# Patient Record
Sex: FEMALE | Race: WHITE | NOT HISPANIC OR LATINO | Employment: OTHER | URBAN - METROPOLITAN AREA
[De-identification: names, ages, dates, MRNs, and addresses within clinical notes are randomized per-mention and may not be internally consistent; named-entity substitution may affect disease eponyms.]

---

## 2017-01-02 ENCOUNTER — OFFICE VISIT (OUTPATIENT)
Dept: URGENT CARE | Facility: CLINIC | Age: 62
End: 2017-01-02
Payer: COMMERCIAL

## 2017-01-02 VITALS
HEIGHT: 66 IN | SYSTOLIC BLOOD PRESSURE: 126 MMHG | RESPIRATION RATE: 18 BRPM | OXYGEN SATURATION: 99 % | TEMPERATURE: 98.4 F | DIASTOLIC BLOOD PRESSURE: 76 MMHG | HEART RATE: 84 BPM | BODY MASS INDEX: 29.41 KG/M2 | WEIGHT: 183 LBS

## 2017-01-02 DIAGNOSIS — S80.02XA CONTUSION OF LEFT KNEE, INITIAL ENCOUNTER: ICD-10-CM

## 2017-01-02 PROCEDURE — 99214 OFFICE O/P EST MOD 30 MIN: CPT | Performed by: FAMILY MEDICINE

## 2017-01-02 RX ORDER — NAPROXEN 500 MG/1
500 TABLET ORAL 2 TIMES DAILY WITH MEALS
Qty: 40 TAB | Refills: 0 | Status: SHIPPED | OUTPATIENT
Start: 2017-01-02 | End: 2022-06-09

## 2017-01-02 ASSESSMENT — ENCOUNTER SYMPTOMS
VOMITING: 0
SHORTNESS OF BREATH: 0
DIZZINESS: 0
NAUSEA: 0
ABDOMINAL PAIN: 0
FEVER: 0

## 2017-01-02 NOTE — MR AVS SNAPSHOT
"        Kim Reyna   2017 6:00 PM   Office Visit   MRN: [id number]    Department:  [facility]   Dept Phone:  [phone]    Description:  Female : 1955   Provider:  Hiren Moreno M.D.           Reason for Visit     Knee Pain Left knee, hit against bed, swollen       Allergies as of 2017     Allergen Noted Reactions    Sulfa Drugs 2010   Anaphylaxis      You were diagnosed with     Contusion of left knee, initial encounter   [[id number]]         Vital Signs     Blood Pressure Pulse Temperature Respirations Height Weight    126/76 mmHg 84 36.9 °C (98.4 °F) 18 1.676 m (5' 5.98\") 83.008 kg (183 lb)    Body Mass Index Oxygen Saturation                29.55 kg/m2 99%          Basic Information     Date Of Birth Sex Race Ethnicity Preferred Language    1955 Female White Non- English      Health Maintenance        Date Due Completion Dates    IMM DTaP/Tdap/Td Vaccine (1 - Tdap) 1975 ---    PAP SMEAR 1977 ---    COLONOSCOPY 2006 ---    IMM ZOSTER VACCINE 2016 ---    IMM INFLUENZA (1) 2016 ---    MAMMOGRAM 2017, 2010, 2010, 2008, 2008, 2007, 2007, 11/3/2006            Current Immunizations     No immunizations on file.      Below and/or attached are the medications your provider expects you to take. Review all of your home medications and newly ordered medications with your provider and/or pharmacist. Follow medication instructions as directed by your provider and/or pharmacist. Please keep your medication list with you and share with your provider. Update the information when medications are discontinued, doses are changed, or new medications (including over-the-counter products) are added; and carry medication information at all times in the event of emergency situations     Allergies:  SULFA DRUGS - Anaphylaxis               Medications  Valid as of: 2017 -  7:09 PM    Generic Name Brand Name Tablet " Size Instructions for use    Diphenoxylate-Atropine (Tab) LOMOTIL 2.5-0.025 MG Take 2 Tabs by mouth 4 times a day as needed for Diarrhea.        Doxycycline Hyclate (Tab) VIBRAMYCIN 100 MG Take 1 Tab by mouth 2 times a day.        Estradiol   by Does not apply route every day.        Estradiol (Tab) ESTRACE 1 MG Take 1 mg by mouth every day.        Lisinopril (Tab) PRINIVIL 10 MG Take 10 mg by mouth every day.        Naproxen (Tab) NAPROSYN 500 MG Take 1 Tab by mouth 2 times a day, with meals. Daily for 5 days, then when necessary for pain        Ondansetron HCl (Tab) ZOFRAN 4 MG Take 1 Tab by mouth 4 times a day as needed for Nausea/Vomiting.        Oxycodone-Acetaminophen (Tab) PERCOCET 5-325 MG Take 1-2 Tabs by mouth every four hours as needed for Mild Pain. pain        .                 Medicines prescribed today were sent to:     [facility] [address] [place] [address] [id number] [facility] [id number]    Phone: [phone] Fax: [phone]    Open 24 Hours?: No      Medication refill instructions:       If your prescription bottle indicates you have medication refills left, it is not necessary to call your provider’s office. Please contact your pharmacy and they will refill your medication.    If your prescription bottle indicates you do not have any refills left, you may request refills at any time through one of the following ways: The online [employer] system (except Urgent Care), by calling your provider’s office, or by asking your pharmacy to contact your provider’s office with a refill request. Medication refills are processed only during regular business hours and may not be available until the next business day. Your provider may request additional information or to have a follow-up visit with you prior to refilling your medication.   *Please Note: Medication refills are assigned a new Rx number when refilled electronically. Your  pharmacy may indicate that no refills were authorized even though a new prescription for the same medication is available at the pharmacy. Please request the medicine by name with the pharmacy before contacting your provider for a refill.           [employer] Access Code: [email or web address]  Expires: 2/1/2017  7:09 PM    [employer]  A secure, online tool to manage your health information     [employer]® is a secure, online tool that connects you to your personalized health information from the privacy of your home -- day or night - making it very easy for you to manage your healthcare. Once the activation process is completed, you can even access your medical information using the [employer] cash, which is available for free in the [employer] Cash store or Google Play store.     [employer] provides the following levels of access (as shown below):   My Chart Features   [facility] Doctor [facility]  Urgent  Care [facility]  Primary Care  Doctor   Email your healthcare team securely and privately 24/7 X X X    Manage appointments: schedule your next appointment; view details of past/upcoming appointments X      Request prescription refills. X      View recent personal medical records, including lab and immunizations X X X X   View health record, including health history, allergies, medications X X X X   Read reports about your outpatient visits, procedures, consult and ER notes X X X X   See your discharge summary, which is a recap of your hospital and/or ER visit that includes your diagnosis, lab results, and care plan. X X       How to register for [employer]:  1. Go to  [email or web address].  2. Click on the Sign Up Now box, which takes you to the New Member Sign Up page. You will need to provide the following information:  a. Enter your [employer] Access Code exactly as it appears at the top of this page. (You will not need to use this code after you’ve completed the sign-up process. If you do not  sign up before the expiration date, you must request a new code.)   b. Enter your date of birth.   c. Enter your home email address.   d. Click Submit, and follow the next screen’s instructions.  3. Create a [employer] ID. This will be your [employer] login ID and cannot be changed, so think of one that is secure and easy to remember.  4. Create a [employer] password. You can change your password at any time.  5. Enter your Password Reset Question and Answer. This can be used at a later time if you forget your password.   6. Enter your e-mail address. This allows you to receive e-mail notifications when new information is available in [employer].  7. Click Sign Up. You can now view your health information.    For assistance activating your [employer] account, call [phone]

## 2017-01-03 NOTE — PROGRESS NOTES
Subjective:      Kim Reyna is a 61 y.o. female who presents with Knee Pain          Knee Pain  This is a new problem. The current episode started yesterday. The problem occurs constantly. The problem has been gradually worsening. Pertinent negatives include no abdominal pain, chest pain, fever, nausea or vomiting. The symptoms are aggravated by walking. She has tried acetaminophen for the symptoms. The treatment provided no relief.   LEFT KNEE pain  Bumped into mother's bed when moving something  No pop  Prior arthroscopies x 4 (prior trauma)  Dull, tight, worse with bending  Peripatellar  No radiation  Improved with rest  Worse with activity    Review of Systems   Constitutional: Negative for fever.   Respiratory: Negative for shortness of breath.    Cardiovascular: Negative for chest pain.   Gastrointestinal: Negative for nausea, vomiting and abdominal pain.   Neurological: Negative for dizziness.        PMH:  has a past medical history of Cancer (HCC) ('93).  MEDS:   Current outpatient prescriptions:   •  lisinopril (PRINIVIL) 10 MG Tab, Take 10 mg by mouth every day., Disp: , Rfl:   •  estradiol (ESTRACE) 1 MG Tab, Take 1 mg by mouth every day., Disp: , Rfl:   •  ESTRADIOL, by Does not apply route every day., Disp: , Rfl:   •  doxycycline (VIBRAMYCIN) 100 MG Tab, Take 1 Tab by mouth 2 times a day., Disp: 20 Tab, Rfl: 0  •  oxycodone-acetaminophen (PERCOCET) 5-325 MG TABS, Take 1-2 Tabs by mouth every four hours as needed for Mild Pain. pain, Disp: 20 Each, Rfl: 0  •  ondansetron (ZOFRAN) 4 MG TABS, Take 1 Tab by mouth 4 times a day as needed for Nausea/Vomiting., Disp: 24 Each, Rfl: 0  •  diphenoxylate-atropine (LOMOTIL) 2.5-0.025 MG TABS, Take 2 Tabs by mouth 4 times a day as needed for Diarrhea., Disp: 16 Each, Rfl: 0  ALLERGIES:   Allergies   Allergen Reactions   • Sulfa Drugs Anaphylaxis     SURGHX:   Past Surgical History   Procedure Laterality Date   • Hysterectomy radical     • Other orthopedic  "surgery       left knee x4     SOCHX:    FH: Family history was reviewed, no pertinent findings to report       Objective:     /76 mmHg  Pulse 84  Temp(Src) 36.9 °C (98.4 °F)  Resp 18  Ht 1.676 m (5' 5.98\")  Wt 83.008 kg (183 lb)  BMI 29.55 kg/m2  SpO2 99%     Physical Exam      No acute distress  Normal respiratory effort  Mildly antalgic gait    Knee exam:  RIGHT KNEE:  Normal alignment  No visual swelling or deformity  Anterior knee nontender  Negative apprehension  POSITIVE medial joint line tenderness without any lateral joint line tenderness  Tamara's testing is negative medially and laterally  Lachman's testing is trace with good endpoint  No laxity to varus and valgus stress  The legs otherwise neurovascularly intact    LEFT KNEE:  Normal alignment  POSITIVE peripatellar swelling without palpable joint effusion  POSITIVE anterior tenderness/medial and lateral facet as well as anterior patellar tenderness   minimal apprehension  No  joint line tenderness medially or laterally  Tamara's testing is negative medially and laterally  Lachman's testing is trace with good endpoint  No laxity to varus and valgus stress  The legs otherwise neurovascularly intact         Assessment/Plan:     1. Contusion of left knee, initial encounter  naproxen (NAPROSYN) 500 MG Tab     Knee contusion with direct trauma while walking  Multiple surgeries of the left knee  Known history of osteoarthritis in the left knee  Majority of symptoms seem to be soft tissue/prepatellar without any intra-articular effusion  Naproxen  Topical anti-inflammatory  Follow-up if symptoms worsen or fail to improve  Follow-up with PCP  "

## 2019-06-03 ENCOUNTER — HOSPITAL ENCOUNTER (OUTPATIENT)
Dept: RADIOLOGY | Facility: MEDICAL CENTER | Age: 64
End: 2019-06-03
Attending: GENERAL PRACTICE
Payer: COMMERCIAL

## 2019-06-03 DIAGNOSIS — Z12.39 SCREENING BREAST EXAMINATION: ICD-10-CM

## 2019-06-03 PROCEDURE — 77067 SCR MAMMO BI INCL CAD: CPT

## 2020-11-27 ENCOUNTER — OFFICE VISIT (OUTPATIENT)
Dept: URGENT CARE | Facility: PHYSICIAN GROUP | Age: 65
End: 2020-11-27
Payer: COMMERCIAL

## 2020-11-27 ENCOUNTER — HOSPITAL ENCOUNTER (OUTPATIENT)
Facility: MEDICAL CENTER | Age: 65
End: 2020-11-27
Attending: NURSE PRACTITIONER
Payer: COMMERCIAL

## 2020-11-27 VITALS
TEMPERATURE: 98.6 F | HEART RATE: 116 BPM | DIASTOLIC BLOOD PRESSURE: 86 MMHG | OXYGEN SATURATION: 96 % | HEIGHT: 66 IN | SYSTOLIC BLOOD PRESSURE: 140 MMHG | RESPIRATION RATE: 20 BRPM | BODY MASS INDEX: 28.93 KG/M2 | WEIGHT: 180 LBS

## 2020-11-27 DIAGNOSIS — R52 GENERALIZED BODY ACHES: ICD-10-CM

## 2020-11-27 DIAGNOSIS — Z20.822 SUSPECTED COVID-19 VIRUS INFECTION: ICD-10-CM

## 2020-11-27 DIAGNOSIS — J02.9 PHARYNGITIS, UNSPECIFIED ETIOLOGY: ICD-10-CM

## 2020-11-27 DIAGNOSIS — R05.9 COUGH: ICD-10-CM

## 2020-11-27 DIAGNOSIS — J06.9 VIRAL URI WITH COUGH: ICD-10-CM

## 2020-11-27 DIAGNOSIS — J45.21 MILD INTERMITTENT ASTHMA WITH EXACERBATION: ICD-10-CM

## 2020-11-27 DIAGNOSIS — Z20.822 SUSPECTED COVID-19 VIRUS INFECTION: Primary | ICD-10-CM

## 2020-11-27 PROCEDURE — U0003 INFECTIOUS AGENT DETECTION BY NUCLEIC ACID (DNA OR RNA); SEVERE ACUTE RESPIRATORY SYNDROME CORONAVIRUS 2 (SARS-COV-2) (CORONAVIRUS DISEASE [COVID-19]), AMPLIFIED PROBE TECHNIQUE, MAKING USE OF HIGH THROUGHPUT TECHNOLOGIES AS DESCRIBED BY CMS-2020-01-R: HCPCS

## 2020-11-27 PROCEDURE — 99204 OFFICE O/P NEW MOD 45 MIN: CPT | Mod: CS | Performed by: NURSE PRACTITIONER

## 2020-11-27 RX ORDER — METHYLPREDNISOLONE 4 MG/1
TABLET ORAL
Qty: 21 TAB | Refills: 0 | Status: SHIPPED | OUTPATIENT
Start: 2020-11-27 | End: 2022-06-09

## 2020-11-27 RX ORDER — DOXYCYCLINE HYCLATE 100 MG
100 TABLET ORAL 2 TIMES DAILY
Qty: 14 TAB | Refills: 0 | Status: SHIPPED | OUTPATIENT
Start: 2020-11-27 | End: 2020-12-04

## 2020-11-27 ASSESSMENT — COPD QUESTIONNAIRES: COPD: 0

## 2020-11-27 ASSESSMENT — ENCOUNTER SYMPTOMS
COUGH: 1
DIZZINESS: 0
SORE THROAT: 1
FEVER: 1
TINGLING: 0
WHEEZING: 0
HEADACHES: 0
VOMITING: 0
MEMORY LOSS: 0
HEARTBURN: 0
DIARRHEA: 0
BACK PAIN: 0
PALPITATIONS: 0
SHORTNESS OF BREATH: 0
ORTHOPNEA: 0
CONSTIPATION: 0
CHILLS: 0
NAUSEA: 0
MYALGIAS: 1

## 2020-11-28 LAB
COVID ORDER STATUS COVID19: NORMAL
SARS-COV-2 RNA RESP QL NAA+PROBE: DETECTED
SPECIMEN SOURCE: ABNORMAL

## 2020-11-28 NOTE — PROGRESS NOTES
Subjective:      Kim Reyna is a 65 y.o. female who presents with Cough (bronchitis, body aches, sleeping more, x6 days )        Patient denies known Covid 19 exposure  Denies changes in sense of taste or smell.  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  She does have asthma which is well controlled and not requiring inhaler more frequently.     Cough  This is a new problem. The current episode started in the past 7 days. The problem has been gradually worsening. The problem occurs every few minutes. The cough is non-productive. Associated symptoms include a fever, myalgias and a sore throat. Pertinent negatives include no chest pain, chills, ear pain, headaches, heartburn, rash, shortness of breath or wheezing. The symptoms are aggravated by exercise. She has tried prescription cough suppressant for the symptoms. The treatment provided mild relief. Her past medical history is significant for asthma and bronchitis. There is no history of COPD, emphysema or pneumonia.       Review of Systems   Constitutional: Positive for fever and malaise/fatigue. Negative for chills.   HENT: Positive for congestion and sore throat. Negative for ear pain.    Respiratory: Positive for cough. Negative for shortness of breath and wheezing.    Cardiovascular: Negative for chest pain, palpitations, orthopnea and leg swelling.   Gastrointestinal: Negative for constipation, diarrhea, heartburn, nausea and vomiting.   Musculoskeletal: Positive for myalgias. Negative for back pain and joint pain.   Skin: Negative for rash.   Neurological: Negative for dizziness, tingling and headaches.   Psychiatric/Behavioral: Negative for memory loss and suicidal ideas.   All other systems reviewed and are negative.    PMH:  has a past medical history of Allergy, Asthma, Cancer ([facility]) ('93), Hypertension, Migraine, and Urinary tract infection, site not specified.  MEDS:   Current Outpatient  Medications:   •  doxycycline (VIBRAMYCIN) 100 MG Tab, Take 1 Tab by mouth 2 times a day for 7 days., Disp: 14 Tab, Rfl: 0  •  methylPREDNISolone (MEDROL DOSEPAK) 4 MG Tablet Therapy Pack, Follow schedule on package instructions., Disp: 21 Tab, Rfl: 0  •  Omega-3 Fatty Acids (FISH OIL) 1000 MG Cap capsule, Take 1,000 mg by mouth 3 times a day, with meals., Disp: , Rfl:   •  Coenzyme Q10 (CO Q 10 PO), Take  by mouth., Disp: , Rfl:   •  lisinopril (PRINIVIL) 10 MG Tab, Take 10 mg by mouth every day., Disp: , Rfl:   •  estradiol (ESTRACE) 1 MG Tab, Take 1 mg by mouth every day., Disp: , Rfl:   •  ESTRADIOL, by Does not apply route every day., Disp: , Rfl:   •  LEVOFLOXACIN OP, by Ophthalmic route., Disp: , Rfl:   •  ibuprofen (MOTRIN) 800 MG Tab, Take 800 mg by mouth every 8 hours as needed., Disp: , Rfl:   •  naproxen (NAPROSYN) 500 MG Tab, Take 1 Tab by mouth 2 times a day, with meals. Daily for 5 days, then when necessary for pain (Patient not taking: Reported on 11/27/2020), Disp: 40 Tab, Rfl: 0  •  oxycodone-acetaminophen (PERCOCET) 5-325 MG TABS, Take 1-2 Tabs by mouth every four hours as needed for Mild Pain. pain (Patient not taking: Reported on 11/27/2020), Disp: 20 Each, Rfl: 0  •  ondansetron (ZOFRAN) 4 MG TABS, Take 1 Tab by mouth 4 times a day as needed for Nausea/Vomiting. (Patient not taking: Reported on 11/27/2020), Disp: 24 Each, Rfl: 0  •  diphenoxylate-atropine (LOMOTIL) 2.5-0.025 MG TABS, Take 2 Tabs by mouth 4 times a day as needed for Diarrhea. (Patient not taking: Reported on 11/27/2020), Disp: 16 Each, Rfl: 0  ALLERGIES:   Allergies   Allergen Reactions   • Sulfa Drugs Anaphylaxis     SURGHX:   Past Surgical History:   Procedure Laterality Date   • CARPAL TUNNEL RELEASE Right    • HYSTERECTOMY RADICAL     • KNEE ARTHROSCOPY      4 surgerys   • OTHER ORTHOPEDIC SURGERY      left knee x4     SOCHX:  reports that she has never smoked. She has never used smokeless tobacco.  FH: Reviewed with  "patient, not pertinent to this visit.        Objective:     /86 (BP Location: Left arm, Patient Position: Sitting, BP Cuff Size: Adult)   Pulse (!) 116   Temp 37 °C (98.6 °F) (Temporal)   Resp 20   Ht 1.676 m (5' 6\")   Wt 81.6 kg (180 lb)   SpO2 96%   BMI 29.05 kg/m²      Physical Exam  Vitals signs reviewed.   Constitutional:       General: She is not in acute distress.     Appearance: Normal appearance. She is ill-appearing.   HENT:      Head: Normocephalic.      Right Ear: Tympanic membrane, ear canal and external ear normal.      Left Ear: Tympanic membrane, ear canal and external ear normal.      Nose: Congestion and rhinorrhea present.      Mouth/Throat:      Lips: Pink.      Mouth: Mucous membranes are moist.      Pharynx: Uvula midline. Posterior oropharyngeal erythema present. No pharyngeal swelling, oropharyngeal exudate or uvula swelling.   Eyes:      Extraocular Movements: Extraocular movements intact.      Conjunctiva/sclera: Conjunctivae normal.      Pupils: Pupils are equal, round, and reactive to light.   Neck:      Musculoskeletal: Normal range of motion and neck supple. No muscular tenderness.   Cardiovascular:      Rate and Rhythm: Regular rhythm. Tachycardia present.      Pulses: Normal pulses.      Heart sounds: Normal heart sounds. No murmur.   Pulmonary:      Effort: Pulmonary effort is normal. No respiratory distress.      Breath sounds: Wheezing present. No rhonchi or rales.   Abdominal:      Palpations: Abdomen is soft.   Musculoskeletal: Normal range of motion.      Right lower leg: No edema.      Left lower leg: No edema.   Lymphadenopathy:      Cervical: No cervical adenopathy.   Skin:     General: Skin is warm and dry.      Capillary Refill: Capillary refill takes less than 2 seconds.   Neurological:      Mental Status: She is alert and oriented to person, place, and time.   Psychiatric:         Mood and Affect: Mood normal.         Behavior: Behavior normal.               "   Assessment/Plan:        1. Suspected COVID-19 virus infection  COVID/SARS COV-2 PCR   2. Mild intermittent asthma with exacerbation  doxycycline (VIBRAMYCIN) 100 MG Tab    methylPREDNISolone (MEDROL DOSEPAK) 4 MG Tablet Therapy Pack   3. Viral URI with cough  doxycycline (VIBRAMYCIN) 100 MG Tab    methylPREDNISolone (MEDROL DOSEPAK) 4 MG Tablet Therapy Pack   4. Generalized body aches  COVID/SARS COV-2 PCR   5. Cough  COVID/SARS COV-2 PCR   6. Pharyngitis, unspecified etiology  COVID/SARS COV-2 PCR       May take over-the-counter Ibuprofen 400-600 mg every 8 hours as needed for pain    Await Covid results.    Discussed with patient that symptoms are suspicious for Covid-19 infection vs other viral illness/bronchitis.  Vitals are stable at this time.  Patient is advised to self isolate and provided with self isolation precautions AVS information.  Discussed when to return to urgent care or ER including for worsening shortness of breath.  Patient verbalized understanding and has no additional questions.    Plan of care, medications and treatments reviewed with patient and or guardian.  Patient and or guardian voices understanding and agrees with the instructions provided. After visit summary reviewed with patient. Patient and or guardian understand the parameters for reevaluation and ER precautions discussed.     Please note that this dictation was created using voice recognition software. I have made every reasonable attempt to correct obvious errors, but I expect that there are errors of grammar and possibly content that I did not discover before finalizing the note.

## 2021-03-01 ENCOUNTER — HOSPITAL ENCOUNTER (OUTPATIENT)
Dept: RADIOLOGY | Facility: MEDICAL CENTER | Age: 66
End: 2021-03-01
Attending: GENERAL PRACTICE
Payer: COMMERCIAL

## 2021-03-01 DIAGNOSIS — Z12.31 VISIT FOR SCREENING MAMMOGRAM: ICD-10-CM

## 2021-03-01 PROCEDURE — 77063 BREAST TOMOSYNTHESIS BI: CPT

## 2021-12-18 ENCOUNTER — OFFICE VISIT (OUTPATIENT)
Dept: URGENT CARE | Facility: PHYSICIAN GROUP | Age: 66
End: 2021-12-18
Payer: MEDICARE

## 2021-12-18 ENCOUNTER — APPOINTMENT (OUTPATIENT)
Dept: RADIOLOGY | Facility: IMAGING CENTER | Age: 66
End: 2021-12-18
Attending: PHYSICIAN ASSISTANT
Payer: MEDICARE

## 2021-12-18 VITALS
RESPIRATION RATE: 16 BRPM | BODY MASS INDEX: 28.93 KG/M2 | HEIGHT: 66 IN | OXYGEN SATURATION: 97 % | DIASTOLIC BLOOD PRESSURE: 66 MMHG | WEIGHT: 180 LBS | TEMPERATURE: 97.6 F | HEART RATE: 72 BPM | SYSTOLIC BLOOD PRESSURE: 128 MMHG

## 2021-12-18 DIAGNOSIS — M79.641 RIGHT HAND PAIN: ICD-10-CM

## 2021-12-18 DIAGNOSIS — S60.221A CONTUSION OF RIGHT HAND, INITIAL ENCOUNTER: ICD-10-CM

## 2021-12-18 PROCEDURE — 99214 OFFICE O/P EST MOD 30 MIN: CPT | Performed by: PHYSICIAN ASSISTANT

## 2021-12-18 PROCEDURE — 73130 X-RAY EXAM OF HAND: CPT | Mod: TC,RT | Performed by: PHYSICIAN ASSISTANT

## 2021-12-18 RX ORDER — DICLOFENAC SODIUM 30 MG/G
1-2 GEL TOPICAL 3 TIMES DAILY
Qty: 100 G | Refills: 0 | Status: SHIPPED | OUTPATIENT
Start: 2021-12-18 | End: 2022-06-09

## 2021-12-18 ASSESSMENT — ENCOUNTER SYMPTOMS
WEAKNESS: 1
MYALGIAS: 1

## 2021-12-18 NOTE — PROGRESS NOTES
Subjective:   Kim Reyna is a 66 y.o. female who presents for Hand Pain (Left hand/wrist and arm pain x 5 days.  Car door was closed on her hand on Monday)        Hand Injury  This is a new problem. The current episode started in the past 7 days (5 days-patient should hand in car door.  She did not injure the forearm or elbow.). The problem occurs constantly. The problem has been unchanged. Associated symptoms include myalgias and weakness. Associated symptoms comments: Bruising and swelling of the dorsal right hand.  Slightly restricted range of motion at first MCP and pain with movement.  Occasionally zapping pain up the forearm to the elbow.. She has tried NSAIDs and rest for the symptoms. The treatment provided mild relief.     Review of Systems   Musculoskeletal: Positive for myalgias.   Neurological: Positive for weakness.       PMH:  has a past medical history of Allergy, Asthma, Cancer ([facility]) ('93), Hypertension, Migraine, and Urinary tract infection, site not specified.  MEDS:   Current Outpatient Medications:   •  diclofenac sodium 3 % Gel, Apply 1-2 g topically 3 times a day., Disp: 100 g, Rfl: 0  •  lisinopril (PRINIVIL) 10 MG Tab, Take 10 mg by mouth every day., Disp: , Rfl:   •  methylPREDNISolone (MEDROL DOSEPAK) 4 MG Tablet Therapy Pack, Follow schedule on package instructions. (Patient not taking: Reported on 12/18/2021), Disp: 21 Tab, Rfl: 0  •  Omega-3 Fatty Acids (FISH OIL) 1000 MG Cap capsule, Take 1,000 mg by mouth 3 times a day, with meals. (Patient not taking: Reported on 12/18/2021), Disp: , Rfl:   •  Coenzyme Q10 (CO Q 10 PO), Take  by mouth. (Patient not taking: Reported on 12/18/2021), Disp: , Rfl:   •  LEVOFLOXACIN OP, by Ophthalmic route. (Patient not taking: Reported on 12/18/2021), Disp: , Rfl:   •  ibuprofen (MOTRIN) 800 MG Tab, Take 800 mg by mouth every 8 hours as needed. (Patient not taking: Reported on 12/18/2021), Disp: , Rfl:   •  naproxen (NAPROSYN) 500 MG Tab, Take 1 Tab  "by mouth 2 times a day, with meals. Daily for 5 days, then when necessary for pain (Patient not taking: Reported on 11/27/2020), Disp: 40 Tab, Rfl: 0  •  estradiol (ESTRACE) 1 MG Tab, Take 1 mg by mouth every day. (Patient not taking: Reported on 12/18/2021), Disp: , Rfl:   •  ESTRADIOL, by Does not apply route every day. (Patient not taking: Reported on 12/18/2021), Disp: , Rfl:   •  oxycodone-acetaminophen (PERCOCET) 5-325 MG TABS, Take 1-2 Tabs by mouth every four hours as needed for Mild Pain. pain (Patient not taking: Reported on 11/27/2020), Disp: 20 Each, Rfl: 0  •  ondansetron (ZOFRAN) 4 MG TABS, Take 1 Tab by mouth 4 times a day as needed for Nausea/Vomiting. (Patient not taking: Reported on 11/27/2020), Disp: 24 Each, Rfl: 0  •  diphenoxylate-atropine (LOMOTIL) 2.5-0.025 MG TABS, Take 2 Tabs by mouth 4 times a day as needed for Diarrhea. (Patient not taking: Reported on 11/27/2020), Disp: 16 Each, Rfl: 0  ALLERGIES:   Allergies   Allergen Reactions   • Sulfa Drugs Anaphylaxis     SURGHX:   Past Surgical History:   Procedure Laterality Date   • CARPAL TUNNEL RELEASE Right    • HYSTERECTOMY RADICAL     • KNEE ARTHROSCOPY      4 surgerys   • OTHER ORTHOPEDIC SURGERY      left knee x4     SOCHX:  reports that she has never smoked. She has never used smokeless tobacco.  FH: Family history was reviewed, no pertinent findings to report   Objective:   /66   Pulse 72   Temp 36.4 °C (97.6 °F) (Temporal)   Resp 16   Ht 1.664 m (5' 5.5\")   Wt 81.6 kg (180 lb)   SpO2 97%   BMI 29.50 kg/m²   Physical Exam  Vitals reviewed.   Constitutional:       General: She is not in acute distress.     Appearance: Normal appearance. She is well-developed. She is not toxic-appearing.   HENT:      Head: Normocephalic and atraumatic.      Right Ear: External ear normal.      Left Ear: External ear normal.      Nose: Nose normal.   Cardiovascular:      Rate and Rhythm: Normal rate and regular rhythm.   Pulmonary:      " Effort: Pulmonary effort is normal. No respiratory distress.      Breath sounds: No stridor.   Musculoskeletal:      Comments: Right hand:  Appearance: Mild ecchymosis and edema over dorsal aspect of hand in vicinity of second and third MCPs.  No gross deformity.  Palpation: Tender to palpation over the second MCP and generalized tenderness of the soft tissues in this area.  Otherwise no bony tenderness with palpation.  No palpable step-offs or deformities.  Wrist and forearm nontender to palpation.  Range of motion: Mildly restricted range of motion at second MTP.  Other digits ranged within normal limits.  Wrist range of motion within normal limits.  Neurovascular: Radial, median, ulnar nerves intact.  Sensation intact.  Radial pulse +2 and cap refill is brisk.     Skin:     General: Skin is dry.   Neurological:      Comments: Alert and oriented. CN2-12 grossly intact   Psychiatric:         Speech: Speech normal.         Behavior: Behavior normal.         Imaging:    COMPARISON:  None     FINDINGS:  No acute fracture or malalignment. There are small osteophytes in the interphalangeal joints.     IMPRESSION:     1.  No acute or subacute fracture.     2.  Mild osteoarthritis of the interphalangeal joints  Assessment/Plan:   1. Contusion of right hand, initial encounter  - diclofenac sodium 3 % Gel; Apply 1-2 g topically 3 times a day.  Dispense: 100 g; Refill: 0    2. Right hand pain  - DX-HAND 3+ RIGHT; Future  - diclofenac sodium 3 % Gel; Apply 1-2 g topically 3 times a day.  Dispense: 100 g; Refill: 0    Imaging reviewed with patient.  No evidence of acute bony injury.  Patient placed in splint for comfort.  I would like her to rest, ice, elevate.  Apply topical diclofenac as needed to mitigate pain and swelling.  If symptoms fail to significantly improve in the next 5 to 7 days I would like her to follow-up with her PCP for reevaluation and further management.    Differential diagnosis, natural history,  supportive care, and indications for immediate follow-up discussed.

## 2022-05-13 ENCOUNTER — TELEPHONE (OUTPATIENT)
Dept: SCHEDULING | Facility: IMAGING CENTER | Age: 67
End: 2022-05-13

## 2022-06-09 ENCOUNTER — OFFICE VISIT (OUTPATIENT)
Dept: MEDICAL GROUP | Facility: PHYSICIAN GROUP | Age: 67
End: 2022-06-09
Payer: MEDICARE

## 2022-06-09 VITALS
DIASTOLIC BLOOD PRESSURE: 64 MMHG | HEART RATE: 73 BPM | BODY MASS INDEX: 29.12 KG/M2 | TEMPERATURE: 98.1 F | SYSTOLIC BLOOD PRESSURE: 136 MMHG | OXYGEN SATURATION: 96 % | WEIGHT: 174.8 LBS | HEIGHT: 65 IN

## 2022-06-09 DIAGNOSIS — I10 PRIMARY HYPERTENSION: ICD-10-CM

## 2022-06-09 DIAGNOSIS — Z12.11 ENCOUNTER FOR SCREENING FOR MALIGNANT NEOPLASM OF COLON: ICD-10-CM

## 2022-06-09 DIAGNOSIS — D22.9 MULTIPLE NEVI: ICD-10-CM

## 2022-06-09 DIAGNOSIS — Z12.31 ENCOUNTER FOR SCREENING MAMMOGRAM FOR BREAST CANCER: ICD-10-CM

## 2022-06-09 DIAGNOSIS — J45.21 MILD INTERMITTENT ASTHMA WITH ACUTE EXACERBATION: ICD-10-CM

## 2022-06-09 DIAGNOSIS — Z23 NEED FOR VACCINATION: ICD-10-CM

## 2022-06-09 DIAGNOSIS — Z11.3 ENCOUNTER FOR SPECIAL SCREENING EXAMINATION FOR INFECTION WITH PREDOMINANTLY SEXUAL MODE OF TRANSMISSION: ICD-10-CM

## 2022-06-09 DIAGNOSIS — Z00.00 ENCOUNTER FOR MEDICAL EXAMINATION TO ESTABLISH CARE: ICD-10-CM

## 2022-06-09 DIAGNOSIS — Z78.0 ENCOUNTER FOR OSTEOPOROSIS SCREENING IN ASYMPTOMATIC POSTMENOPAUSAL PATIENT: ICD-10-CM

## 2022-06-09 DIAGNOSIS — Z13.820 ENCOUNTER FOR OSTEOPOROSIS SCREENING IN ASYMPTOMATIC POSTMENOPAUSAL PATIENT: ICD-10-CM

## 2022-06-09 DIAGNOSIS — G43.009 MIGRAINE WITHOUT AURA AND WITHOUT STATUS MIGRAINOSUS, NOT INTRACTABLE: ICD-10-CM

## 2022-06-09 PROBLEM — J45.909 ASTHMA: Status: ACTIVE | Noted: 2022-06-09

## 2022-06-09 PROBLEM — G43.909 MIGRAINES: Status: ACTIVE | Noted: 2022-06-09

## 2022-06-09 PROCEDURE — 90471 IMMUNIZATION ADMIN: CPT

## 2022-06-09 PROCEDURE — 90677 PCV20 VACCINE IM: CPT

## 2022-06-09 PROCEDURE — 90715 TDAP VACCINE 7 YRS/> IM: CPT

## 2022-06-09 PROCEDURE — 99214 OFFICE O/P EST MOD 30 MIN: CPT

## 2022-06-09 PROCEDURE — 90472 IMMUNIZATION ADMIN EACH ADD: CPT

## 2022-06-09 RX ORDER — ALBUTEROL SULFATE 90 UG/1
2 AEROSOL, METERED RESPIRATORY (INHALATION) 2 TIMES DAILY
COMMUNITY
Start: 2022-03-25

## 2022-06-09 RX ORDER — LISINOPRIL 10 MG/1
20 TABLET ORAL DAILY
Qty: 90 TABLET | Refills: 3 | Status: CANCELLED | OUTPATIENT
Start: 2022-06-09

## 2022-06-09 ASSESSMENT — PATIENT HEALTH QUESTIONNAIRE - PHQ9: CLINICAL INTERPRETATION OF PHQ2 SCORE: 0

## 2022-06-09 NOTE — LETTER
[facility]  Kaleb Dunlap M.D.  [address] [facility] [address]  Fax: [phone]   Authorization for Release/Disclosure of   Protected Health Information   Name: TALI CARRERO : 1955 SSN: [id number]   Address: [address] [address] Phone:    [phone] (home) [phone] (work)   I authorize the entity listed below to release/disclose the PHI below to:   [facility]/Kaleb Dunlap M.D. and DELL Mcdonald   Provider or Entity Name:  Dr Kaleb Dunlap    Address   [facility], [facility]  [address] [place] [address] Phone:  [phone]    Fax:  [phone]   Reason for request: continuity of care   Information to be released:    [  ] LAST COLONOSCOPY,  including any PATH REPORT and follow-up  [  ] LAST FIT/COLOGUARD RESULT [  ] LAST DEXA  [  ] LAST MAMMOGRAM  [  ] LAST PAP  [  ] LAST LABS [  ] RETINA EXAM REPORT  [  ] IMMUNIZATION RECORDS  [X] Release all info      [  ] Check here and initial the line next to each item to release ALL health information INCLUDING  _____ Care and treatment for drug and / or alcohol abuse  _____ HIV testing, infection status, or AIDS  _____ Genetic Testing    DATES OF SERVICE OR TIME PERIOD TO BE DISCLOSED: _____________  I understand and acknowledge that:  * This Authorization may be revoked at any time by you in writing, except if your health information has already been used or disclosed.  * Your health information that will be used or disclosed as a result of you signing this authorization could be re-disclosed by the recipient. If this occurs, your re-disclosed health information may no longer be protected by State or Federal laws.  * You may refuse to sign this Authorization. Your refusal will not affect your ability to obtain treatment.  * This Authorization becomes effective upon signing and will  on (date) __________.      If no date is indicated, this Authorization will  one (1) year from the signature date.    Name:  Kim Reyna    Signature:  continuity of care   Date:     6/9/2022       PLEASE FAX REQUESTED RECORDS BACK TO: [phone]
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
## 2022-06-09 NOTE — ASSESSMENT & PLAN NOTE
This is a chronic condition, stable.  Patient states she gets a migraine approximately once a month.  She denies aura.  She takes over-the-counter medication with good results.  She states does not debilitating and it does not stop her from doing the things she needs to do.  Continue current management with over-the-counter symptom control.

## 2022-06-09 NOTE — ASSESSMENT & PLAN NOTE
Is a new condition.  Patient states she started developing bumps on her face.  There is one on her right eyelid and 2 on her chin.  Upon assessment, consistent with multiple Nevi.  States she is interested in getting them removed.  Referral placed to dermatology.

## 2022-06-09 NOTE — ASSESSMENT & PLAN NOTE
Chronic, not controlled.  Lisinopril 10 mg.  136/64 in clinic today  Doesn't monitor BP at home.  Will come back in 2 weeks for MA visit for BP check.  Will consider increasing Lisinopril at that time.

## 2022-06-17 ENCOUNTER — HOSPITAL ENCOUNTER (OUTPATIENT)
Dept: LAB | Facility: MEDICAL CENTER | Age: 67
End: 2022-06-17
Payer: MEDICARE

## 2022-06-17 DIAGNOSIS — Z00.00 ENCOUNTER FOR MEDICAL EXAMINATION TO ESTABLISH CARE: ICD-10-CM

## 2022-06-17 DIAGNOSIS — Z11.3 ENCOUNTER FOR SPECIAL SCREENING EXAMINATION FOR INFECTION WITH PREDOMINANTLY SEXUAL MODE OF TRANSMISSION: ICD-10-CM

## 2022-06-17 LAB
25(OH)D3 SERPL-MCNC: 30 NG/ML (ref 30–100)
ALBUMIN SERPL BCP-MCNC: 4.6 G/DL (ref 3.2–4.9)
ALBUMIN/GLOB SERPL: 1.4 G/DL
ALP SERPL-CCNC: 87 U/L (ref 30–99)
ALT SERPL-CCNC: 17 U/L (ref 2–50)
ANION GAP SERPL CALC-SCNC: 13 MMOL/L (ref 7–16)
AST SERPL-CCNC: 19 U/L (ref 12–45)
BASOPHILS # BLD AUTO: 3.5 % (ref 0–1.8)
BASOPHILS # BLD: 0.33 K/UL (ref 0–0.12)
BILIRUB SERPL-MCNC: 0.3 MG/DL (ref 0.1–1.5)
BUN SERPL-MCNC: 17 MG/DL (ref 8–22)
CALCIUM SERPL-MCNC: 9.5 MG/DL (ref 8.5–10.5)
CHLORIDE SERPL-SCNC: 105 MMOL/L (ref 96–112)
CHOLEST SERPL-MCNC: 237 MG/DL (ref 100–199)
CO2 SERPL-SCNC: 24 MMOL/L (ref 20–33)
CREAT SERPL-MCNC: 1.11 MG/DL (ref 0.5–1.4)
EOSINOPHIL # BLD AUTO: 0.25 K/UL (ref 0–0.51)
EOSINOPHIL NFR BLD: 2.6 % (ref 0–6.9)
ERYTHROCYTE [DISTWIDTH] IN BLOOD BY AUTOMATED COUNT: 44.3 FL (ref 35.9–50)
EST. AVERAGE GLUCOSE BLD GHB EST-MCNC: 134 MG/DL
FASTING STATUS PATIENT QL REPORTED: NORMAL
GFR SERPLBLD CREATININE-BSD FMLA CKD-EPI: 55 ML/MIN/1.73 M 2
GLOBULIN SER CALC-MCNC: 3.4 G/DL (ref 1.9–3.5)
GLUCOSE SERPL-MCNC: 113 MG/DL (ref 65–99)
HBA1C MFR BLD: 6.3 % (ref 4–5.6)
HCT VFR BLD AUTO: 41.6 % (ref 37–47)
HCV AB SER QL: NORMAL
HDLC SERPL-MCNC: 47 MG/DL
HGB BLD-MCNC: 13.5 G/DL (ref 12–16)
LDLC SERPL CALC-MCNC: 162 MG/DL
LYMPHOCYTES # BLD AUTO: 3.42 K/UL (ref 1–4.8)
LYMPHOCYTES NFR BLD: 36 % (ref 22–41)
MANUAL DIFF BLD: NORMAL
MCH RBC QN AUTO: 29.5 PG (ref 27–33)
MCHC RBC AUTO-ENTMCNC: 32.5 G/DL (ref 33.6–35)
MCV RBC AUTO: 90.8 FL (ref 81.4–97.8)
MONOCYTES # BLD AUTO: 0.58 K/UL (ref 0–0.85)
MONOCYTES NFR BLD AUTO: 6.1 % (ref 0–13.4)
MORPHOLOGY BLD-IMP: NORMAL
NEUTROPHILS # BLD AUTO: 4.92 K/UL (ref 2–7.15)
NEUTROPHILS NFR BLD: 51.8 % (ref 44–72)
NRBC # BLD AUTO: 0 K/UL
NRBC BLD-RTO: 0 /100 WBC
PLATELET # BLD AUTO: 290 K/UL (ref 164–446)
PLATELET BLD QL SMEAR: NORMAL
PMV BLD AUTO: 10.7 FL (ref 9–12.9)
POTASSIUM SERPL-SCNC: 4.5 MMOL/L (ref 3.6–5.5)
PROT SERPL-MCNC: 8 G/DL (ref 6–8.2)
RBC # BLD AUTO: 4.58 M/UL (ref 4.2–5.4)
RBC BLD AUTO: NORMAL
SODIUM SERPL-SCNC: 142 MMOL/L (ref 135–145)
TRIGL SERPL-MCNC: 140 MG/DL (ref 0–149)
TSH SERPL DL<=0.005 MIU/L-ACNC: 1.4 UIU/ML (ref 0.38–5.33)
WBC # BLD AUTO: 9.5 K/UL (ref 4.8–10.8)

## 2022-06-17 PROCEDURE — 80061 LIPID PANEL: CPT

## 2022-06-17 PROCEDURE — 85025 COMPLETE CBC W/AUTO DIFF WBC: CPT

## 2022-06-17 PROCEDURE — 84443 ASSAY THYROID STIM HORMONE: CPT

## 2022-06-17 PROCEDURE — 36415 COLL VENOUS BLD VENIPUNCTURE: CPT

## 2022-06-17 PROCEDURE — 86803 HEPATITIS C AB TEST: CPT

## 2022-06-17 PROCEDURE — 80053 COMPREHEN METABOLIC PANEL: CPT

## 2022-06-17 PROCEDURE — 85007 BL SMEAR W/DIFF WBC COUNT: CPT

## 2022-06-17 PROCEDURE — 83036 HEMOGLOBIN GLYCOSYLATED A1C: CPT

## 2022-06-17 PROCEDURE — 82306 VITAMIN D 25 HYDROXY: CPT

## 2022-06-21 ENCOUNTER — HOSPITAL ENCOUNTER (OUTPATIENT)
Dept: RADIOLOGY | Facility: MEDICAL CENTER | Age: 67
End: 2022-06-21
Payer: MEDICARE

## 2022-06-21 DIAGNOSIS — Z12.31 ENCOUNTER FOR SCREENING MAMMOGRAM FOR BREAST CANCER: ICD-10-CM

## 2022-06-21 PROCEDURE — 77063 BREAST TOMOSYNTHESIS BI: CPT

## 2022-06-23 ENCOUNTER — NON-PROVIDER VISIT (OUTPATIENT)
Dept: MEDICAL GROUP | Facility: PHYSICIAN GROUP | Age: 67
End: 2022-06-23
Payer: MEDICARE

## 2022-06-23 VITALS — SYSTOLIC BLOOD PRESSURE: 116 MMHG | DIASTOLIC BLOOD PRESSURE: 64 MMHG

## 2022-06-23 NOTE — PROGRESS NOTES
Kim Reyna is a 67 y.o. female here for a non-provider visit for BP check      Encounter Vitals  Blood Pressure : 116/64    If abnormal, was the Registered Nurse (office provider if RN is unavailable) notified today? Not Indicated    Routed to PCP/Requested Provider? Yes

## 2022-06-24 PROBLEM — E78.41 ELEVATED LIPOPROTEIN(A): Status: ACTIVE | Noted: 2022-06-24

## 2022-07-01 ENCOUNTER — HOSPITAL ENCOUNTER (OUTPATIENT)
Dept: RADIOLOGY | Facility: MEDICAL CENTER | Age: 67
End: 2022-07-01
Payer: MEDICARE

## 2022-07-01 DIAGNOSIS — Z78.0 ENCOUNTER FOR OSTEOPOROSIS SCREENING IN ASYMPTOMATIC POSTMENOPAUSAL PATIENT: ICD-10-CM

## 2022-07-01 DIAGNOSIS — Z13.820 ENCOUNTER FOR OSTEOPOROSIS SCREENING IN ASYMPTOMATIC POSTMENOPAUSAL PATIENT: ICD-10-CM

## 2022-07-01 PROCEDURE — 77080 DXA BONE DENSITY AXIAL: CPT

## 2022-08-02 ENCOUNTER — OFFICE VISIT (OUTPATIENT)
Dept: MEDICAL GROUP | Facility: PHYSICIAN GROUP | Age: 67
End: 2022-08-02
Payer: MEDICARE

## 2022-08-02 VITALS
HEIGHT: 65 IN | TEMPERATURE: 98.9 F | WEIGHT: 172.6 LBS | BODY MASS INDEX: 28.76 KG/M2 | OXYGEN SATURATION: 97 % | DIASTOLIC BLOOD PRESSURE: 62 MMHG | HEART RATE: 78 BPM | SYSTOLIC BLOOD PRESSURE: 100 MMHG

## 2022-08-02 DIAGNOSIS — E78.00 PURE HYPERCHOLESTEROLEMIA: ICD-10-CM

## 2022-08-02 DIAGNOSIS — R73.03 PREDIABETES: ICD-10-CM

## 2022-08-02 DIAGNOSIS — I10 PRIMARY HYPERTENSION: ICD-10-CM

## 2022-08-02 DIAGNOSIS — N18.31 STAGE 3A CHRONIC KIDNEY DISEASE: ICD-10-CM

## 2022-08-02 PROCEDURE — 99212 OFFICE O/P EST SF 10 MIN: CPT

## 2022-08-02 ASSESSMENT — FIBROSIS 4 INDEX: FIB4 SCORE: 1.05

## 2022-08-02 NOTE — ASSESSMENT & PLAN NOTE
Chronic, ongoing.  LDL - 162.   The 10-year ASCVD risk score (Blairelizabeth GREWAL Jr., et al., 2013) is: 5.9%   Medication not indicated at this time. Counseled on diet and exercise.

## 2022-08-02 NOTE — ASSESSMENT & PLAN NOTE
Chronic, controlled.  Patient takes lisinopril 10 mg daily.  Her blood pressure in clinic today is 100/62.    Patient's last blood pressure in clinic was elevated at 136/64.  Patient does not monitor her blood pressure at home.  I had asked her to return for an MA visit for blood pressure check but does not appear that occurred.

## 2022-08-02 NOTE — ASSESSMENT & PLAN NOTE
New problem to examiner.  Reviewed results with patient.  Patient is on lisinopril.  Encouraged the patient to take vitamin D to prevent a deficiency.  Patient does not appear to be fluid overloaded, diuretics not indicated.  Patient encouraged to follow a low-sodium, low-protein diet.  Will trend labs every 6 months.

## 2022-08-02 NOTE — ASSESSMENT & PLAN NOTE
Chronic, ongoing.  Medication not indicated at this time.  Patient counseled on diet and exercise modification.  Will review A1c in 1 year.   Latest Reference Range & Units 06/17/22 10:16   Glycohemoglobin 4.0 - 5.6 % 6.3 (H)

## 2022-08-02 NOTE — PROGRESS NOTES
"Subjective:     CC:   Chief Complaint   Patient presents with   • Lab Results       HISTORY OF THE PRESENT ILLNESS: Kim is a pleasant 67 y.o. female here today to to review labs.  Patient was routinely followed by a provider in [place].  This is the first set of labs on file since 2010 that we have been able to review.  Labs have revealed new onset prediabetes, hyperlipidemia and Stage IIIa chronic kidney disease.      Health Maintenance: Completed    ROS:  All systems negative expect as addressed in assessment and plan.       Objective:     Exam:  /62 (BP Location: Left arm, Patient Position: Sitting, BP Cuff Size: Adult)   Pulse 78   Temp 37.2 °C (98.9 °F) (Temporal)   Ht 1.651 m (5' 5\")   Wt 78.3 kg (172 lb 9.6 oz)   SpO2 97%   BMI 28.72 kg/m²  Body mass index is 28.72 kg/m².    Physical Exam  Vitals reviewed.   Constitutional:       Appearance: Normal appearance.   Cardiovascular:      Rate and Rhythm: Normal rate.      Pulses: Normal pulses.   Pulmonary:      Effort: Pulmonary effort is normal.   Musculoskeletal:         General: Normal range of motion.   Skin:     General: Skin is warm and dry.   Neurological:      Mental Status: Mental status is at baseline.   Psychiatric:         Mood and Affect: Mood normal.         Behavior: Behavior normal.       Labs: Results reviewed from 06/17/22    Assessment & Plan:     67 y.o. female with the following -    1. Pure hypercholesterolemia  New problem.  LDL - 162.   The 10-year ASCVD risk score ([place] CARMINA Jr., et al., 2013) is: 5.9%   Medication not indicated at this time. Counseled on diet and exercise.     2. Prediabetes  New problem.  Medication not indicated at this time.  Patient counseled on diet and exercise modification.  Will review A1c in 1 year.   Latest Reference Range & Units 06/17/22 10:16   Glycohemoglobin 4.0 - 5.6 % 6.3 (H)     3. Primary hypertension  Chronic, controlled.  Patient takes lisinopril 10 mg daily.  Her blood pressure in " clinic today is 100/62.    Patient's last blood pressure in clinic was elevated at 136/64.  Patient does not monitor her blood pressure at home.  I had asked her to return for an MA visit for blood pressure check but does not appear that occurred.    4. Stage 3a chronic kidney disease (HCC)  New problem to examiner.  Reviewed results with patient.  Patient is on lisinopril.  Encouraged the patient to take vitamin D to prevent a deficiency.  Patient does not appear to be fluid overloaded, diuretics not indicated.  Patient encouraged to follow a low-sodium, low-protein diet.  Will trend labs every 6 months.  - Basic Metabolic Panel; Future    Patient was educated in proper administration of medication(s) ordered today including safety, possible SE, risks, benefits, rationale and alternatives to therapy.   Supportive care, differential diagnoses, and indications for immediate follow-up discussed with patient.    Pathogenesis of diagnosis discussed including typical length and natural progression.    Instructed to return to clinic or nearest emergency department for any change in condition, further concerns, or worsening of symptoms.  Patient states understanding of the plan of care and discharge instructions.    Return in about 6 months (around 2/2/2023) for Lab Review.    I have placed the above orders and discussed them with an approved delegating provider.  The MA is performing the below orders under the direction of Dr. Ladd.    Please note that this dictation was created using voice recognition software. I have worked with consultants from the vendor as well as technical experts from [email or web address] [employer] to optimize the interface. I have made every reasonable attempt to correct obvious errors, but I expect that there are errors of grammar and possibly content that I did not discover before finalizing the note.

## 2022-08-24 ENCOUNTER — RESEARCH ENCOUNTER (OUTPATIENT)
Dept: CARDIOLOGY | Facility: MEDICAL CENTER | Age: 67
End: 2022-08-24
Payer: MEDICARE

## 2022-08-24 DIAGNOSIS — Z00.6 RESEARCH STUDY PATIENT: ICD-10-CM

## 2022-09-25 LAB
APOB+LDLR+PCSK9 GENE MUT ANL BLD/T: NOT DETECTED
BRCA1+BRCA2 DEL+DUP + FULL MUT ANL BLD/T: NOT DETECTED
MLH1+MSH2+MSH6+PMS2 GN DEL+DUP+FUL M: NOT DETECTED

## 2022-11-09 ENCOUNTER — PATIENT MESSAGE (OUTPATIENT)
Dept: HEALTH INFORMATION MANAGEMENT | Facility: OTHER | Age: 67
End: 2022-11-09

## 2022-11-30 RX ORDER — LISINOPRIL 10 MG/1
10 TABLET ORAL DAILY
Qty: 90 TABLET | Refills: 3 | Status: SHIPPED | OUTPATIENT
Start: 2022-11-30 | End: 2023-10-05 | Stop reason: SDUPTHER

## 2022-11-30 NOTE — TELEPHONE ENCOUNTER
Received request via: Patient    Was the patient seen in the last year in this department? Yes    Does the patient have an active prescription (recently filled or refills available) for medication(s) requested? No    Does the patient have group home Plus and need 100 day supply (blood pressure, diabetes and cholesterol meds only)? Patient does not have SCP

## 2022-12-08 ENCOUNTER — OFFICE VISIT (OUTPATIENT)
Dept: MEDICAL GROUP | Facility: PHYSICIAN GROUP | Age: 67
End: 2022-12-08
Payer: MEDICARE

## 2022-12-08 VITALS
RESPIRATION RATE: 16 BRPM | TEMPERATURE: 97.5 F | WEIGHT: 174 LBS | HEART RATE: 80 BPM | BODY MASS INDEX: 28.99 KG/M2 | SYSTOLIC BLOOD PRESSURE: 110 MMHG | DIASTOLIC BLOOD PRESSURE: 64 MMHG | HEIGHT: 65 IN | OXYGEN SATURATION: 95 %

## 2022-12-08 DIAGNOSIS — R94.4 DECREASED GFR: ICD-10-CM

## 2022-12-08 DIAGNOSIS — R73.03 PREDIABETES: ICD-10-CM

## 2022-12-08 DIAGNOSIS — R06.02 SHORTNESS OF BREATH: ICD-10-CM

## 2022-12-08 DIAGNOSIS — I10 PRIMARY HYPERTENSION: ICD-10-CM

## 2022-12-08 DIAGNOSIS — Z00.00 WELLNESS EXAMINATION: ICD-10-CM

## 2022-12-08 DIAGNOSIS — E78.00 PURE HYPERCHOLESTEROLEMIA: ICD-10-CM

## 2022-12-08 DIAGNOSIS — R07.89 CHEST TIGHTNESS: ICD-10-CM

## 2022-12-08 PROCEDURE — 99214 OFFICE O/P EST MOD 30 MIN: CPT

## 2022-12-08 ASSESSMENT — FIBROSIS 4 INDEX: FIB4 SCORE: 1.05

## 2022-12-08 NOTE — PROGRESS NOTES
"Subjective:     CC:   Chief Complaint   Patient presents with    Follow-Up    URI       HISTORY OF THE PRESENT ILLNESS: Kim is a pleasant 67 y.o. female here today to for a follow-up on chronic conditions.  Patient states she has no concerns.  Her blood pressure is stable.  So she would like to discuss some symptoms she has been experiencing since Monday that include:  A little chest tightness  Occasional shortness of breath  Chills  Minimal congestion  Ears are a little achy    She denies wheezing, cough, sore throat, fever.    She does have a diagnosis of asthma and has an albuterol inhaler, however, patient states she has not needed to use it.    Patient also states her mom recently became ill and she has been very overwhelmed trying to care for her.  She has not been sleeping much and feels rundown.    Health Maintenance: Completed    ROS:  All systems negative expect as addressed in assessment and plan.     Objective:     Exam:  /64 (BP Location: Right arm, Patient Position: Sitting, BP Cuff Size: Adult)   Pulse 80   Temp 36.4 °C (97.5 °F) (Temporal)   Resp 16   Ht 1.651 m (5' 5\")   Wt 78.9 kg (174 lb)   SpO2 95%   BMI 28.96 kg/m²  Body mass index is 28.96 kg/m².    Physical Exam  Vitals reviewed.   Constitutional:       Appearance: Normal appearance.   HENT:      Right Ear: Tympanic membrane normal.      Left Ear: Tympanic membrane normal.   Cardiovascular:      Rate and Rhythm: Normal rate.      Pulses: Normal pulses.      Heart sounds: Normal heart sounds.   Pulmonary:      Effort: Pulmonary effort is normal.      Breath sounds: Normal breath sounds.   Abdominal:      General: Abdomen is flat.      Palpations: Abdomen is soft.   Musculoskeletal:         General: Normal range of motion.   Skin:     General: Skin is warm and dry.   Neurological:      Mental Status: Mental status is at baseline.   Psychiatric:         Mood and Affect: Mood normal.         Behavior: Behavior normal.       Labs: " Results reviewed from 06/17/22    Assessment & Plan:     67 y.o. female with the following -    1. Chest tightness  2. Shortness of breath  This is a new problem x4 days.  Patient does have asthma and has not been using her inhaler.  I do not see an indication to swab for COVID, strep or flu.  I encouraged the patient to use her albuterol inhaler.  If symptoms worsen, she is encouraged to return for further assessment.    3. Primary hypertension  Chronic, controlled.  Lisinopril 10 mg daily.  Continue current management.    4. Prediabetes  - HEMOGLOBIN A1C; Future    5. Pure hypercholesterolemia  - Lipid Profile; Future    6. Decreased GFR  Trending labs every six months  - Basic Metabolic Panel; Future    7. Wellness examination  - Lipid Profile; Future  - HEMOGLOBIN A1C; Future  - Comp Metabolic Panel; Future  - CBC WITH DIFFERENTIAL; Future  - VITAMIN D,25 HYDROXY (DEFICIENCY); Future  - TSH WITH REFLEX TO FT4; Future    Patient was educated in proper administration of medication(s) ordered today including safety, possible SE, risks, benefits, rationale and alternatives to therapy.   Supportive care, differential diagnoses, and indications for immediate follow-up discussed with patient.    Pathogenesis of diagnosis discussed including typical length and natural progression.    Instructed to return to clinic or nearest emergency department for any change in condition, further concerns, or worsening of symptoms.  Patient states understanding of the plan of care and discharge instructions.    Return in about 6 months (around 6/8/2023).    I spent a total of 32 minutes with record review, exam, and communication with the patient, communication with other providers, and documentation of this encounter. This does not include time spent on separately billable procedures/tests.    I have placed the above orders and discussed them with an approved delegating provider.  The MA is performing the below orders under the  direction of Dr. Ladd.    Please note that this dictation was created using voice recognition software. I have worked with consultants from the vendor as well as technical experts from [facility] to optimize the interface. I have made every reasonable attempt to correct obvious errors, but I expect that there are errors of grammar and possibly content that I did not discover before finalizing the note.

## 2023-03-30 ENCOUNTER — TELEPHONE (OUTPATIENT)
Dept: HEALTH INFORMATION MANAGEMENT | Facility: OTHER | Age: 68
End: 2023-03-30

## 2023-06-06 ENCOUNTER — HOSPITAL ENCOUNTER (OUTPATIENT)
Dept: LAB | Facility: MEDICAL CENTER | Age: 68
End: 2023-06-06
Payer: MEDICARE

## 2023-06-06 DIAGNOSIS — E78.00 PURE HYPERCHOLESTEROLEMIA: ICD-10-CM

## 2023-06-06 DIAGNOSIS — R73.03 PREDIABETES: ICD-10-CM

## 2023-06-06 DIAGNOSIS — Z00.00 WELLNESS EXAMINATION: ICD-10-CM

## 2023-06-06 DIAGNOSIS — R94.4 DECREASED GFR: ICD-10-CM

## 2023-06-06 DIAGNOSIS — N18.31 STAGE 3A CHRONIC KIDNEY DISEASE: ICD-10-CM

## 2023-06-06 LAB
25(OH)D3 SERPL-MCNC: 34 NG/ML (ref 30–100)
BASOPHILS # BLD AUTO: 1.4 % (ref 0–1.8)
BASOPHILS # BLD: 0.1 K/UL (ref 0–0.12)
EOSINOPHIL # BLD AUTO: 0.31 K/UL (ref 0–0.51)
EOSINOPHIL NFR BLD: 4.2 % (ref 0–6.9)
ERYTHROCYTE [DISTWIDTH] IN BLOOD BY AUTOMATED COUNT: 42.8 FL (ref 35.9–50)
EST. AVERAGE GLUCOSE BLD GHB EST-MCNC: 126 MG/DL
HBA1C MFR BLD: 6 % (ref 4–5.6)
HCT VFR BLD AUTO: 39.2 % (ref 37–47)
HGB BLD-MCNC: 12.6 G/DL (ref 12–16)
IMM GRANULOCYTES # BLD AUTO: 0.02 K/UL (ref 0–0.11)
IMM GRANULOCYTES NFR BLD AUTO: 0.3 % (ref 0–0.9)
LYMPHOCYTES # BLD AUTO: 1.98 K/UL (ref 1–4.8)
LYMPHOCYTES NFR BLD: 27.1 % (ref 22–41)
MCH RBC QN AUTO: 29.9 PG (ref 27–33)
MCHC RBC AUTO-ENTMCNC: 32.1 G/DL (ref 32.2–35.5)
MCV RBC AUTO: 92.9 FL (ref 81.4–97.8)
MONOCYTES # BLD AUTO: 0.55 K/UL (ref 0–0.85)
MONOCYTES NFR BLD AUTO: 7.5 % (ref 0–13.4)
NEUTROPHILS # BLD AUTO: 4.34 K/UL (ref 1.82–7.42)
NEUTROPHILS NFR BLD: 59.5 % (ref 44–72)
NRBC # BLD AUTO: 0 K/UL
NRBC BLD-RTO: 0 /100 WBC (ref 0–0.2)
PLATELET # BLD AUTO: 268 K/UL (ref 164–446)
PMV BLD AUTO: 10.5 FL (ref 9–12.9)
RBC # BLD AUTO: 4.22 M/UL (ref 4.2–5.4)
TSH SERPL DL<=0.005 MIU/L-ACNC: 0.98 UIU/ML (ref 0.38–5.33)
WBC # BLD AUTO: 7.3 K/UL (ref 4.8–10.8)

## 2023-06-06 PROCEDURE — 83036 HEMOGLOBIN GLYCOSYLATED A1C: CPT | Mod: GA

## 2023-06-06 PROCEDURE — 85025 COMPLETE CBC W/AUTO DIFF WBC: CPT

## 2023-06-06 PROCEDURE — 84443 ASSAY THYROID STIM HORMONE: CPT

## 2023-06-06 PROCEDURE — 36415 COLL VENOUS BLD VENIPUNCTURE: CPT

## 2023-06-06 PROCEDURE — 82306 VITAMIN D 25 HYDROXY: CPT

## 2023-06-06 PROCEDURE — 80053 COMPREHEN METABOLIC PANEL: CPT

## 2023-06-06 PROCEDURE — 80061 LIPID PANEL: CPT

## 2023-06-07 LAB
ALBUMIN SERPL BCP-MCNC: 4.3 G/DL (ref 3.2–4.9)
ALBUMIN/GLOB SERPL: 1.3 G/DL
ALP SERPL-CCNC: 73 U/L (ref 30–99)
ALT SERPL-CCNC: 15 U/L (ref 2–50)
ANION GAP SERPL CALC-SCNC: 11 MMOL/L (ref 7–16)
AST SERPL-CCNC: 20 U/L (ref 12–45)
BILIRUB SERPL-MCNC: 0.3 MG/DL (ref 0.1–1.5)
BUN SERPL-MCNC: 23 MG/DL (ref 8–22)
CALCIUM ALBUM COR SERPL-MCNC: 9.4 MG/DL (ref 8.5–10.5)
CALCIUM SERPL-MCNC: 9.6 MG/DL (ref 8.5–10.5)
CHLORIDE SERPL-SCNC: 107 MMOL/L (ref 96–112)
CHOLEST SERPL-MCNC: 226 MG/DL (ref 100–199)
CO2 SERPL-SCNC: 24 MMOL/L (ref 20–33)
CREAT SERPL-MCNC: 1.17 MG/DL (ref 0.5–1.4)
FASTING STATUS PATIENT QL REPORTED: NORMAL
GFR SERPLBLD CREATININE-BSD FMLA CKD-EPI: 51 ML/MIN/1.73 M 2
GLOBULIN SER CALC-MCNC: 3.3 G/DL (ref 1.9–3.5)
GLUCOSE SERPL-MCNC: 81 MG/DL (ref 65–99)
HDLC SERPL-MCNC: 46 MG/DL
LDLC SERPL CALC-MCNC: 147 MG/DL
POTASSIUM SERPL-SCNC: 4.6 MMOL/L (ref 3.6–5.5)
PROT SERPL-MCNC: 7.6 G/DL (ref 6–8.2)
SODIUM SERPL-SCNC: 142 MMOL/L (ref 135–145)
TRIGL SERPL-MCNC: 166 MG/DL (ref 0–149)

## 2023-06-13 ENCOUNTER — OFFICE VISIT (OUTPATIENT)
Dept: MEDICAL GROUP | Facility: PHYSICIAN GROUP | Age: 68
End: 2023-06-13
Payer: MEDICARE

## 2023-06-13 VITALS
RESPIRATION RATE: 16 BRPM | HEART RATE: 81 BPM | DIASTOLIC BLOOD PRESSURE: 62 MMHG | WEIGHT: 177.6 LBS | OXYGEN SATURATION: 94 % | HEIGHT: 65 IN | SYSTOLIC BLOOD PRESSURE: 124 MMHG | BODY MASS INDEX: 29.59 KG/M2 | TEMPERATURE: 98.6 F

## 2023-06-13 DIAGNOSIS — E78.00 PURE HYPERCHOLESTEROLEMIA: ICD-10-CM

## 2023-06-13 DIAGNOSIS — I10 PRIMARY HYPERTENSION: ICD-10-CM

## 2023-06-13 DIAGNOSIS — Z12.11 ENCOUNTER FOR SCREENING FOR MALIGNANT NEOPLASM OF COLON: ICD-10-CM

## 2023-06-13 DIAGNOSIS — R53.83 OTHER FATIGUE: ICD-10-CM

## 2023-06-13 DIAGNOSIS — R94.4 DECREASED GFR: ICD-10-CM

## 2023-06-13 DIAGNOSIS — N19 RENAL FAILURE, UNSPECIFIED CHRONICITY: ICD-10-CM

## 2023-06-13 DIAGNOSIS — N18.9 CHRONIC KIDNEY DISEASE, UNSPECIFIED CKD STAGE: ICD-10-CM

## 2023-06-13 DIAGNOSIS — R73.03 PREDIABETES: ICD-10-CM

## 2023-06-13 DIAGNOSIS — M17.12 OSTEOARTHRITIS OF LEFT KNEE, UNSPECIFIED OSTEOARTHRITIS TYPE: ICD-10-CM

## 2023-06-13 PROCEDURE — 99215 OFFICE O/P EST HI 40 MIN: CPT

## 2023-06-13 PROCEDURE — 3078F DIAST BP <80 MM HG: CPT

## 2023-06-13 PROCEDURE — 3074F SYST BP LT 130 MM HG: CPT

## 2023-06-13 RX ORDER — CELECOXIB 200 MG/1
CAPSULE ORAL
COMMUNITY
Start: 2023-05-26 | End: 2023-08-29

## 2023-06-13 RX ORDER — CELECOXIB 200 MG/1
CAPSULE ORAL
COMMUNITY
End: 2023-08-29

## 2023-06-13 RX ORDER — ATORVASTATIN CALCIUM 20 MG/1
20 TABLET, FILM COATED ORAL NIGHTLY
Qty: 90 TABLET | Refills: 3 | Status: SHIPPED | OUTPATIENT
Start: 2023-06-13

## 2023-06-13 ASSESSMENT — FIBROSIS 4 INDEX: FIB4 SCORE: 1.290994448735805629

## 2023-06-13 ASSESSMENT — PATIENT HEALTH QUESTIONNAIRE - PHQ9: CLINICAL INTERPRETATION OF PHQ2 SCORE: 0

## 2023-06-13 NOTE — ASSESSMENT & PLAN NOTE
Chronic, improving   Latest Reference Range & Units 06/06/23 12:41   Glycohemoglobin 4.0 - 5.6 % 6.0 (H)   - continue healthy diet, weight reduction, daily physical activity   - consider metformin up to 850mg BID to slow or offset progression to T2D as per DPP trial   - monitor labs Q6-12mo

## 2023-06-13 NOTE — ASSESSMENT & PLAN NOTE
Chronic, ongoing  The 10-year ASCVD risk score (Curly STARKS, et al., 2019) is: 7.7%  Not on medication.    Latest Reference Range & Units 06/06/23 12:41   Cholesterol,Tot 100 - 199 mg/dL 226 (H)   Triglycerides 0 - 149 mg/dL 166 (H)   HDL >=40 mg/dL 46   LDL <100 mg/dL 147 (H)   (H): Data is abnormally high

## 2023-06-13 NOTE — ASSESSMENT & PLAN NOTE
Chronic, ongoing.  Continue monitoring every 6 months.   Latest Reference Range & Units 06/06/23 12:41   Bun 8 - 22 mg/dL 23 (H)   Creatinine 0.50 - 1.40 mg/dL 1.17   GFR (CKD-EPI) >60 mL/min/1.73 m 2 51 !   (H): Data is abnormally high  !: Data is abnormal

## 2023-06-13 NOTE — PROGRESS NOTES
"Subjective:     CC:   Chief Complaint   Patient presents with    Follow-Up     6 Month FV/Labs     HISTORY OF THE PRESENT ILLNESS: Kim is a pleasant 68 y.o. female here today for a six month follow-up and lab review.    Problem   Osteoarthritis of Left Knee    She is being followed by an orthopedist in [place].  She recently started celebrex and is being sent to PT.       Prediabetes   Decreased Gfr   Pure Hypercholesterolemia   Hypertension       Health Maintenance: Completed    ROS:  All systems negative expect as addressed in assessment and plan.     Objective:     Exam:  /62 (BP Location: Right arm, Patient Position: Sitting, BP Cuff Size: Adult)   Pulse 81   Temp 37 °C (98.6 °F) (Temporal)   Resp 16   Ht 1.651 m (5' 5\")   Wt 80.6 kg (177 lb 9.6 oz)   SpO2 94%   BMI 29.55 kg/m²  Body mass index is 29.55 kg/m².    Physical Exam  Vitals reviewed.   Constitutional:       Appearance: Normal appearance.   Cardiovascular:      Rate and Rhythm: Normal rate.   Pulmonary:      Effort: Pulmonary effort is normal.   Musculoskeletal:         General: Normal range of motion.   Skin:     General: Skin is warm and dry.   Neurological:      Mental Status: Mental status is at baseline.   Psychiatric:         Mood and Affect: Mood normal.         Behavior: Behavior normal.       Labs: Results reviewed from 06/06/23    Assessment & Plan:     68 y.o. female with the following -    1. Pure hypercholesterolemia  Chronic, ongoing  The 10-year ASCVD risk score (Curly STARKS, et al., 2019) is: 7.7%  Not on medication.  Discussed benefits versus risks.  Patient would like to start a medication to decrease her risk.  We will start her on atorvastatin 20 mg nightly.  We will repeat lipid panel in 6 months.   Latest Reference Range & Units 06/06/23 12:41   Cholesterol,Tot 100 - 199 mg/dL 226 (H)   Triglycerides 0 - 149 mg/dL 166 (H)   HDL >=40 mg/dL 46   LDL <100 mg/dL 147 (H)     - atorvastatin (LIPITOR) 20 MG Tab; Take 1 " Tablet by mouth every evening.  Dispense: 90 Tablet; Refill: 3  - Lipid Profile; Future    2. Prediabetes  Chronic, improving   Latest Reference Range & Units 06/06/23 12:41   Glycohemoglobin 4.0 - 5.6 % 6.0 (H)   - continue healthy diet, weight reduction, daily physical activity   - consider metformin up to 850mg BID to slow or offset progression to T2D as per DPP trial   - monitor labs Q6-12mo     3. Primary hypertension  Chronic, controlled.  BP in clinic is 124/62  Continue current management with lisinopril 10 mg daily.    4. Decreased GFR  See CKD below.    5. Osteoarthritis of left knee, unspecified osteoarthritis type  Chronic, ongoing.  I talked with the patient about Celebrex as she did recently start taking this.  Let her know with her kidney function, it could worsen her GFR.  Encouraged her to take Tylenol as an alternative.  We discussed topical creams but she states this is never worked for her in the past.  She will discuss management with her orthopedist.    6. Other fatigue  - FERRITIN; Future  - IRON/TOTAL IRON BIND; Future  - VIT B12,  FOLIC ACID    7. Renal failure, unspecified chronicity  - FERRITIN; Future    8. Chronic kidney disease, unspecified CKD stage  Chronic, ongoing.  Continue monitoring every 6 months.   Latest Reference Range & Units 06/06/23 12:41   Bun 8 - 22 mg/dL 23 (H)   Creatinine 0.50 - 1.40 mg/dL 1.17   GFR (CKD-EPI) >60 mL/min/1.73 m 2 51 !   (H): Data is abnormally high  !: Data is abnormal  - IRON/TOTAL IRON BIND; Future  - Comp Metabolic Panel; Future    9. Encounter for screening for malignant neoplasm of colon  - SONY (FIT DNA)    Other orders  - celecoxib (CELEBREX) 200 MG Cap; TAKE 1 CAPSULE BY MOUTH EVERY DAY AS NEEDED  - celecoxib (CELEBREX) 200 MG Cap; celecoxib 200 mg capsule   TAKE 1 CAPSULE BY MOUTH EVERY DAY AS NEEDED      Patient was educated in proper administration of medication(s) ordered today including safety, possible SE, risks, benefits, rationale  and alternatives to therapy.   Supportive care, differential diagnoses, and indications for immediate follow-up discussed with patient.    Pathogenesis of diagnosis discussed including typical length and natural progression.    Instructed to return to clinic or nearest emergency department for any change in condition, further concerns, or worsening of symptoms.  Patient states understanding of the plan of care and discharge instructions.    Return for Medicare Annual Wellness Visit (40 min).    I spent a total of 40 minutes with record review, exam, and communication with the patient, communication with other providers, and documentation of this encounter. This does not include time spent on separately billable procedures/tests.    I have placed the above orders and discussed them with an approved delegating provider.  The MA is performing the below orders under the direction of Dr. Ladd.    Please note that this dictation was created using voice recognition software. I have worked with consultants from the vendor as well as technical experts from [facility] to optimize the interface. I have made every reasonable attempt to correct obvious errors, but I expect that there are errors of grammar and possibly content that I did not discover before finalizing the note.

## 2023-08-07 ENCOUNTER — HOSPITAL ENCOUNTER (OUTPATIENT)
Dept: LAB | Facility: MEDICAL CENTER | Age: 68
End: 2023-08-07
Payer: MEDICARE

## 2023-08-07 DIAGNOSIS — N19 RENAL FAILURE, UNSPECIFIED CHRONICITY: ICD-10-CM

## 2023-08-07 DIAGNOSIS — N18.9 CHRONIC KIDNEY DISEASE, UNSPECIFIED CKD STAGE: ICD-10-CM

## 2023-08-07 DIAGNOSIS — R53.83 OTHER FATIGUE: ICD-10-CM

## 2023-08-07 LAB
FERRITIN SERPL-MCNC: 98.2 NG/ML (ref 10–291)
FOLATE SERPL-MCNC: 12.9 NG/ML
IRON SATN MFR SERPL: 28 % (ref 15–55)
IRON SERPL-MCNC: 82 UG/DL (ref 40–170)
TIBC SERPL-MCNC: 298 UG/DL (ref 250–450)
UIBC SERPL-MCNC: 216 UG/DL (ref 110–370)
VIT B12 SERPL-MCNC: 456 PG/ML (ref 211–911)

## 2023-08-07 PROCEDURE — 83540 ASSAY OF IRON: CPT

## 2023-08-07 PROCEDURE — 82746 ASSAY OF FOLIC ACID SERUM: CPT

## 2023-08-07 PROCEDURE — 36415 COLL VENOUS BLD VENIPUNCTURE: CPT

## 2023-08-07 PROCEDURE — 83550 IRON BINDING TEST: CPT

## 2023-08-07 PROCEDURE — 82607 VITAMIN B-12: CPT

## 2023-08-07 PROCEDURE — 82728 ASSAY OF FERRITIN: CPT

## 2023-08-27 SDOH — ECONOMIC STABILITY: INCOME INSECURITY: HOW HARD IS IT FOR YOU TO PAY FOR THE VERY BASICS LIKE FOOD, HOUSING, MEDICAL CARE, AND HEATING?: NOT VERY HARD

## 2023-08-27 SDOH — ECONOMIC STABILITY: FOOD INSECURITY: WITHIN THE PAST 12 MONTHS, THE FOOD YOU BOUGHT JUST DIDN'T LAST AND YOU DIDN'T HAVE MONEY TO GET MORE.: NEVER TRUE

## 2023-08-27 SDOH — ECONOMIC STABILITY: HOUSING INSECURITY
IN THE LAST 12 MONTHS, WAS THERE A TIME WHEN YOU DID NOT HAVE A STEADY PLACE TO SLEEP OR SLEPT IN A SHELTER (INCLUDING NOW)?: NO

## 2023-08-27 SDOH — ECONOMIC STABILITY: FOOD INSECURITY: WITHIN THE PAST 12 MONTHS, YOU WORRIED THAT YOUR FOOD WOULD RUN OUT BEFORE YOU GOT MONEY TO BUY MORE.: NEVER TRUE

## 2023-08-27 SDOH — HEALTH STABILITY: PHYSICAL HEALTH: ON AVERAGE, HOW MANY MINUTES DO YOU ENGAGE IN EXERCISE AT THIS LEVEL?: 60 MIN

## 2023-08-27 SDOH — ECONOMIC STABILITY: TRANSPORTATION INSECURITY
IN THE PAST 12 MONTHS, HAS LACK OF RELIABLE TRANSPORTATION KEPT YOU FROM MEDICAL APPOINTMENTS, MEETINGS, WORK OR FROM GETTING THINGS NEEDED FOR DAILY LIVING?: NO

## 2023-08-27 SDOH — ECONOMIC STABILITY: TRANSPORTATION INSECURITY
IN THE PAST 12 MONTHS, HAS THE LACK OF TRANSPORTATION KEPT YOU FROM MEDICAL APPOINTMENTS OR FROM GETTING MEDICATIONS?: NO

## 2023-08-27 SDOH — ECONOMIC STABILITY: INCOME INSECURITY: IN THE LAST 12 MONTHS, WAS THERE A TIME WHEN YOU WERE NOT ABLE TO PAY THE MORTGAGE OR RENT ON TIME?: NO

## 2023-08-27 SDOH — ECONOMIC STABILITY: HOUSING INSECURITY: IN THE LAST 12 MONTHS, HOW MANY PLACES HAVE YOU LIVED?: 1

## 2023-08-27 SDOH — HEALTH STABILITY: PHYSICAL HEALTH: ON AVERAGE, HOW MANY DAYS PER WEEK DO YOU ENGAGE IN MODERATE TO STRENUOUS EXERCISE (LIKE A BRISK WALK)?: 3 DAYS

## 2023-08-27 SDOH — ECONOMIC STABILITY: TRANSPORTATION INSECURITY
IN THE PAST 12 MONTHS, HAS LACK OF TRANSPORTATION KEPT YOU FROM MEETINGS, WORK, OR FROM GETTING THINGS NEEDED FOR DAILY LIVING?: NO

## 2023-08-27 SDOH — HEALTH STABILITY: MENTAL HEALTH
STRESS IS WHEN SOMEONE FEELS TENSE, NERVOUS, ANXIOUS, OR CAN'T SLEEP AT NIGHT BECAUSE THEIR MIND IS TROUBLED. HOW STRESSED ARE YOU?: PATIENT DECLINED

## 2023-08-27 ASSESSMENT — SOCIAL DETERMINANTS OF HEALTH (SDOH)
HOW MANY DRINKS CONTAINING ALCOHOL DO YOU HAVE ON A TYPICAL DAY WHEN YOU ARE DRINKING: PATIENT DOES NOT DRINK
HOW OFTEN DO YOU ATTEND CHURCH OR RELIGIOUS SERVICES?: NEVER
HOW HARD IS IT FOR YOU TO PAY FOR THE VERY BASICS LIKE FOOD, HOUSING, MEDICAL CARE, AND HEATING?: NOT VERY HARD
HOW OFTEN DO YOU HAVE A DRINK CONTAINING ALCOHOL: NEVER
HOW OFTEN DO YOU ATTEND CHURCH OR RELIGIOUS SERVICES?: NEVER
IN A TYPICAL WEEK, HOW MANY TIMES DO YOU TALK ON THE PHONE WITH FAMILY, FRIENDS, OR NEIGHBORS?: MORE THAN THREE TIMES A WEEK
WITHIN THE PAST 12 MONTHS, YOU WORRIED THAT YOUR FOOD WOULD RUN OUT BEFORE YOU GOT THE MONEY TO BUY MORE: NEVER TRUE
HOW OFTEN DO YOU ATTENT MEETINGS OF THE CLUB OR ORGANIZATION YOU BELONG TO?: PATIENT DECLINED
HOW OFTEN DO YOU ATTENT MEETINGS OF THE CLUB OR ORGANIZATION YOU BELONG TO?: PATIENT DECLINED
IN A TYPICAL WEEK, HOW MANY TIMES DO YOU TALK ON THE PHONE WITH FAMILY, FRIENDS, OR NEIGHBORS?: MORE THAN THREE TIMES A WEEK
DO YOU BELONG TO ANY CLUBS OR ORGANIZATIONS SUCH AS CHURCH GROUPS UNIONS, FRATERNAL OR ATHLETIC GROUPS, OR SCHOOL GROUPS?: NO
DO YOU BELONG TO ANY CLUBS OR ORGANIZATIONS SUCH AS CHURCH GROUPS UNIONS, FRATERNAL OR ATHLETIC GROUPS, OR SCHOOL GROUPS?: NO
HOW OFTEN DO YOU HAVE SIX OR MORE DRINKS ON ONE OCCASION: NEVER

## 2023-08-27 ASSESSMENT — LIFESTYLE VARIABLES
HOW OFTEN DO YOU HAVE SIX OR MORE DRINKS ON ONE OCCASION: NEVER
HOW MANY STANDARD DRINKS CONTAINING ALCOHOL DO YOU HAVE ON A TYPICAL DAY: PATIENT DOES NOT DRINK
SKIP TO QUESTIONS 9-10: 1
HOW OFTEN DO YOU HAVE A DRINK CONTAINING ALCOHOL: NEVER
AUDIT-C TOTAL SCORE: 0

## 2023-08-29 ENCOUNTER — OFFICE VISIT (OUTPATIENT)
Dept: MEDICAL GROUP | Facility: PHYSICIAN GROUP | Age: 68
End: 2023-08-29
Payer: MEDICARE

## 2023-08-29 VITALS
BODY MASS INDEX: 28.99 KG/M2 | OXYGEN SATURATION: 93 % | WEIGHT: 174 LBS | DIASTOLIC BLOOD PRESSURE: 78 MMHG | TEMPERATURE: 99.2 F | HEIGHT: 65 IN | SYSTOLIC BLOOD PRESSURE: 116 MMHG | RESPIRATION RATE: 14 BRPM | HEART RATE: 78 BPM

## 2023-08-29 DIAGNOSIS — E78.00 PURE HYPERCHOLESTEROLEMIA: ICD-10-CM

## 2023-08-29 DIAGNOSIS — F43.21 ADJUSTMENT DISORDER WITH DEPRESSED MOOD: ICD-10-CM

## 2023-08-29 DIAGNOSIS — J45.20 MILD INTERMITTENT ASTHMA WITHOUT COMPLICATION: ICD-10-CM

## 2023-08-29 DIAGNOSIS — I10 PRIMARY HYPERTENSION: ICD-10-CM

## 2023-08-29 DIAGNOSIS — R73.03 PREDIABETES: ICD-10-CM

## 2023-08-29 PROCEDURE — 3074F SYST BP LT 130 MM HG: CPT | Performed by: FAMILY MEDICINE

## 2023-08-29 PROCEDURE — 3078F DIAST BP <80 MM HG: CPT | Performed by: FAMILY MEDICINE

## 2023-08-29 PROCEDURE — 99214 OFFICE O/P EST MOD 30 MIN: CPT | Performed by: FAMILY MEDICINE

## 2023-08-29 ASSESSMENT — FIBROSIS 4 INDEX: FIB4 SCORE: 1.290994448735805629

## 2023-10-05 RX ORDER — LISINOPRIL 10 MG/1
10 TABLET ORAL DAILY
Qty: 90 TABLET | Refills: 3 | Status: SHIPPED | OUTPATIENT
Start: 2023-10-05 | End: 2023-11-06 | Stop reason: SDUPTHER

## 2023-10-10 ENCOUNTER — PATIENT MESSAGE (OUTPATIENT)
Dept: MEDICAL GROUP | Facility: PHYSICIAN GROUP | Age: 68
End: 2023-10-10
Payer: MEDICARE

## 2023-10-20 ENCOUNTER — DOCUMENTATION (OUTPATIENT)
Dept: HEALTH INFORMATION MANAGEMENT | Facility: OTHER | Age: 68
End: 2023-10-20
Payer: MEDICARE

## 2023-11-03 ENCOUNTER — HOSPITAL ENCOUNTER (OUTPATIENT)
Dept: LAB | Facility: MEDICAL CENTER | Age: 68
End: 2023-11-03
Attending: STUDENT IN AN ORGANIZED HEALTH CARE EDUCATION/TRAINING PROGRAM
Payer: MEDICARE

## 2023-11-03 LAB
BASOPHILS # BLD AUTO: 1.2 % (ref 0–1.8)
BASOPHILS # BLD: 0.11 K/UL (ref 0–0.12)
CRP SERPL HS-MCNC: 1.77 MG/DL (ref 0–0.75)
EOSINOPHIL # BLD AUTO: 0.31 K/UL (ref 0–0.51)
EOSINOPHIL NFR BLD: 3.4 % (ref 0–6.9)
ERYTHROCYTE [DISTWIDTH] IN BLOOD BY AUTOMATED COUNT: 43.5 FL (ref 35.9–50)
ERYTHROCYTE [SEDIMENTATION RATE] IN BLOOD BY WESTERGREN METHOD: 31 MM/HOUR (ref 0–25)
HCT VFR BLD AUTO: 37.9 % (ref 37–47)
HGB BLD-MCNC: 12.2 G/DL (ref 12–16)
IMM GRANULOCYTES # BLD AUTO: 0.02 K/UL (ref 0–0.11)
IMM GRANULOCYTES NFR BLD AUTO: 0.2 % (ref 0–0.9)
LYMPHOCYTES # BLD AUTO: 2.52 K/UL (ref 1–4.8)
LYMPHOCYTES NFR BLD: 27.3 % (ref 22–41)
MCH RBC QN AUTO: 29.5 PG (ref 27–33)
MCHC RBC AUTO-ENTMCNC: 32.2 G/DL (ref 32.2–35.5)
MCV RBC AUTO: 91.8 FL (ref 81.4–97.8)
MONOCYTES # BLD AUTO: 0.73 K/UL (ref 0–0.85)
MONOCYTES NFR BLD AUTO: 7.9 % (ref 0–13.4)
NEUTROPHILS # BLD AUTO: 5.54 K/UL (ref 1.82–7.42)
NEUTROPHILS NFR BLD: 60 % (ref 44–72)
NRBC # BLD AUTO: 0 K/UL
NRBC BLD-RTO: 0 /100 WBC (ref 0–0.2)
PLATELET # BLD AUTO: 359 K/UL (ref 164–446)
PMV BLD AUTO: 9.8 FL (ref 9–12.9)
RBC # BLD AUTO: 4.13 M/UL (ref 4.2–5.4)
WBC # BLD AUTO: 9.2 K/UL (ref 4.8–10.8)

## 2023-11-03 PROCEDURE — 85025 COMPLETE CBC W/AUTO DIFF WBC: CPT | Mod: GA

## 2023-11-03 PROCEDURE — 85652 RBC SED RATE AUTOMATED: CPT

## 2023-11-03 PROCEDURE — 36415 COLL VENOUS BLD VENIPUNCTURE: CPT

## 2023-11-03 PROCEDURE — 86140 C-REACTIVE PROTEIN: CPT

## 2024-01-19 ENCOUNTER — HOSPITAL ENCOUNTER (OUTPATIENT)
Dept: LAB | Facility: MEDICAL CENTER | Age: 69
End: 2024-01-19
Attending: STUDENT IN AN ORGANIZED HEALTH CARE EDUCATION/TRAINING PROGRAM
Payer: MEDICARE

## 2024-01-19 LAB
BASOPHILS # BLD AUTO: 1 % (ref 0–1.8)
BASOPHILS # BLD: 0.08 K/UL (ref 0–0.12)
CRP SERPL HS-MCNC: 0.87 MG/DL (ref 0–0.75)
EOSINOPHIL # BLD AUTO: 0.22 K/UL (ref 0–0.51)
EOSINOPHIL NFR BLD: 2.8 % (ref 0–6.9)
ERYTHROCYTE [DISTWIDTH] IN BLOOD BY AUTOMATED COUNT: 39.6 FL (ref 35.9–50)
ERYTHROCYTE [SEDIMENTATION RATE] IN BLOOD BY WESTERGREN METHOD: 22 MM/HOUR (ref 0–25)
HCT VFR BLD AUTO: 41.1 % (ref 37–47)
HGB BLD-MCNC: 13.3 G/DL (ref 12–16)
IMM GRANULOCYTES # BLD AUTO: 0.02 K/UL (ref 0–0.11)
IMM GRANULOCYTES NFR BLD AUTO: 0.3 % (ref 0–0.9)
LYMPHOCYTES # BLD AUTO: 1.7 K/UL (ref 1–4.8)
LYMPHOCYTES NFR BLD: 21.5 % (ref 22–41)
MCH RBC QN AUTO: 29.1 PG (ref 27–33)
MCHC RBC AUTO-ENTMCNC: 32.4 G/DL (ref 32.2–35.5)
MCV RBC AUTO: 89.9 FL (ref 81.4–97.8)
MONOCYTES # BLD AUTO: 0.47 K/UL (ref 0–0.85)
MONOCYTES NFR BLD AUTO: 5.9 % (ref 0–13.4)
NEUTROPHILS # BLD AUTO: 5.43 K/UL (ref 1.82–7.42)
NEUTROPHILS NFR BLD: 68.5 % (ref 44–72)
NRBC # BLD AUTO: 0 K/UL
NRBC BLD-RTO: 0 /100 WBC (ref 0–0.2)
PLATELET # BLD AUTO: 279 K/UL (ref 164–446)
PMV BLD AUTO: 9.7 FL (ref 9–12.9)
RBC # BLD AUTO: 4.57 M/UL (ref 4.2–5.4)
WBC # BLD AUTO: 7.9 K/UL (ref 4.8–10.8)

## 2024-01-19 PROCEDURE — 85025 COMPLETE CBC W/AUTO DIFF WBC: CPT | Mod: GA

## 2024-01-19 PROCEDURE — 86140 C-REACTIVE PROTEIN: CPT

## 2024-01-19 PROCEDURE — 36415 COLL VENOUS BLD VENIPUNCTURE: CPT

## 2024-01-19 PROCEDURE — 85652 RBC SED RATE AUTOMATED: CPT

## 2024-04-09 RX ORDER — LISINOPRIL 10 MG/1
10 TABLET ORAL DAILY
Qty: 90 TABLET | Refills: 3 | Status: SHIPPED | OUTPATIENT
Start: 2024-04-09

## 2024-04-09 NOTE — TELEPHONE ENCOUNTER
Received request via: Pharmacy    Was the patient seen in the last year in this department? Yes    Does the patient have an active prescription (recently filled or refills available) for medication(s) requested? No    [facility]    Does the patient have [facility] Plus and need 100 day supply (blood pressure, diabetes and cholesterol meds only)? Patient does not have SCP

## 2024-05-03 ENCOUNTER — DOCUMENTATION (OUTPATIENT)
Dept: HEALTH INFORMATION MANAGEMENT | Facility: OTHER | Age: 69
End: 2024-05-03
Payer: MEDICARE

## 2024-06-09 DIAGNOSIS — E78.00 PURE HYPERCHOLESTEROLEMIA: ICD-10-CM

## 2024-06-10 NOTE — TELEPHONE ENCOUNTER
Received request via: Pharmacy    Was the patient seen in the last year in this department? Yes    Does the patient have an active prescription (recently filled or refills available) for medication(s) requested? No    [facility]    Does the patient have group home Plus and need 100 day supply (blood pressure, diabetes and cholesterol meds only)? Patient does not have SCP

## 2024-06-11 RX ORDER — ATORVASTATIN CALCIUM 20 MG/1
20 TABLET, FILM COATED ORAL NIGHTLY
Qty: 90 TABLET | Refills: 3 | Status: SHIPPED | OUTPATIENT
Start: 2024-06-11

## 2024-06-21 ENCOUNTER — HOSPITAL ENCOUNTER (OUTPATIENT)
Dept: RADIOLOGY | Facility: MEDICAL CENTER | Age: 69
End: 2024-06-21
Attending: FAMILY MEDICINE
Payer: MEDICARE

## 2024-06-21 DIAGNOSIS — Z12.31 VISIT FOR SCREENING MAMMOGRAM: ICD-10-CM

## 2024-06-21 PROCEDURE — 77063 BREAST TOMOSYNTHESIS BI: CPT

## 2024-07-26 ENCOUNTER — HOSPITAL ENCOUNTER (OUTPATIENT)
Dept: LAB | Facility: MEDICAL CENTER | Age: 69
End: 2024-07-26
Attending: PHYSICIAN ASSISTANT
Payer: COMMERCIAL

## 2024-07-26 LAB
BASOPHILS # BLD AUTO: 1 % (ref 0–1.8)
BASOPHILS # BLD: 0.08 K/UL (ref 0–0.12)
CRP SERPL HS-MCNC: 0.65 MG/DL (ref 0–0.75)
EOSINOPHIL # BLD AUTO: 0.23 K/UL (ref 0–0.51)
EOSINOPHIL NFR BLD: 3 % (ref 0–6.9)
ERYTHROCYTE [DISTWIDTH] IN BLOOD BY AUTOMATED COUNT: 41.4 FL (ref 35.9–50)
ERYTHROCYTE [SEDIMENTATION RATE] IN BLOOD BY WESTERGREN METHOD: 11 MM/HOUR (ref 0–25)
HCT VFR BLD AUTO: 40.1 % (ref 37–47)
HGB BLD-MCNC: 13.4 G/DL (ref 12–16)
IMM GRANULOCYTES # BLD AUTO: 0.01 K/UL (ref 0–0.11)
IMM GRANULOCYTES NFR BLD AUTO: 0.1 % (ref 0–0.9)
LYMPHOCYTES # BLD AUTO: 1.54 K/UL (ref 1–4.8)
LYMPHOCYTES NFR BLD: 20.1 % (ref 22–41)
MCH RBC QN AUTO: 30.6 PG (ref 27–33)
MCHC RBC AUTO-ENTMCNC: 33.4 G/DL (ref 32.2–35.5)
MCV RBC AUTO: 91.6 FL (ref 81.4–97.8)
MONOCYTES # BLD AUTO: 0.41 K/UL (ref 0–0.85)
MONOCYTES NFR BLD AUTO: 5.3 % (ref 0–13.4)
NEUTROPHILS # BLD AUTO: 5.41 K/UL (ref 1.82–7.42)
NEUTROPHILS NFR BLD: 70.5 % (ref 44–72)
NRBC # BLD AUTO: 0 K/UL
NRBC BLD-RTO: 0 /100 WBC (ref 0–0.2)
PLATELET # BLD AUTO: 265 K/UL (ref 164–446)
PMV BLD AUTO: 10 FL (ref 9–12.9)
RBC # BLD AUTO: 4.38 M/UL (ref 4.2–5.4)
WBC # BLD AUTO: 7.7 K/UL (ref 4.8–10.8)

## 2024-07-26 PROCEDURE — 36415 COLL VENOUS BLD VENIPUNCTURE: CPT

## 2024-07-26 PROCEDURE — 85025 COMPLETE CBC W/AUTO DIFF WBC: CPT

## 2024-07-26 PROCEDURE — 86140 C-REACTIVE PROTEIN: CPT

## 2024-07-26 PROCEDURE — 85652 RBC SED RATE AUTOMATED: CPT

## 2024-08-20 SDOH — ECONOMIC STABILITY: INCOME INSECURITY: IN THE LAST 12 MONTHS, WAS THERE A TIME WHEN YOU WERE NOT ABLE TO PAY THE MORTGAGE OR RENT ON TIME?: NO

## 2024-08-20 SDOH — HEALTH STABILITY: PHYSICAL HEALTH: ON AVERAGE, HOW MANY MINUTES DO YOU ENGAGE IN EXERCISE AT THIS LEVEL?: PATIENT DECLINED

## 2024-08-20 SDOH — ECONOMIC STABILITY: FOOD INSECURITY: WITHIN THE PAST 12 MONTHS, THE FOOD YOU BOUGHT JUST DIDN'T LAST AND YOU DIDN'T HAVE MONEY TO GET MORE.: NEVER TRUE

## 2024-08-20 SDOH — HEALTH STABILITY: PHYSICAL HEALTH
ON AVERAGE, HOW MANY DAYS PER WEEK DO YOU ENGAGE IN MODERATE TO STRENUOUS EXERCISE (LIKE A BRISK WALK)?: PATIENT DECLINED

## 2024-08-20 SDOH — ECONOMIC STABILITY: FOOD INSECURITY: WITHIN THE PAST 12 MONTHS, YOU WORRIED THAT YOUR FOOD WOULD RUN OUT BEFORE YOU GOT MONEY TO BUY MORE.: NEVER TRUE

## 2024-08-20 ASSESSMENT — SOCIAL DETERMINANTS OF HEALTH (SDOH)
HOW OFTEN DO YOU GET TOGETHER WITH FRIENDS OR RELATIVES?: PATIENT DECLINED
IN A TYPICAL WEEK, HOW MANY TIMES DO YOU TALK ON THE PHONE WITH FAMILY, FRIENDS, OR NEIGHBORS?: MORE THAN THREE TIMES A WEEK
DO YOU BELONG TO ANY CLUBS OR ORGANIZATIONS SUCH AS CHURCH GROUPS UNIONS, FRATERNAL OR ATHLETIC GROUPS, OR SCHOOL GROUPS?: NO
HOW OFTEN DO YOU ATTEND CHURCH OR RELIGIOUS SERVICES?: PATIENT DECLINED
HOW OFTEN DO YOU GET TOGETHER WITH FRIENDS OR RELATIVES?: PATIENT DECLINED
HOW OFTEN DO YOU ATTENT MEETINGS OF THE CLUB OR ORGANIZATION YOU BELONG TO?: PATIENT DECLINED
HOW OFTEN DO YOU ATTEND CHURCH OR RELIGIOUS SERVICES?: PATIENT DECLINED
IN A TYPICAL WEEK, HOW MANY TIMES DO YOU TALK ON THE PHONE WITH FAMILY, FRIENDS, OR NEIGHBORS?: MORE THAN THREE TIMES A WEEK
HOW OFTEN DO YOU ATTENT MEETINGS OF THE CLUB OR ORGANIZATION YOU BELONG TO?: PATIENT DECLINED
DO YOU BELONG TO ANY CLUBS OR ORGANIZATIONS SUCH AS CHURCH GROUPS UNIONS, FRATERNAL OR ATHLETIC GROUPS, OR SCHOOL GROUPS?: NO
IN THE PAST 12 MONTHS, HAS THE ELECTRIC, GAS, OIL, OR WATER COMPANY THREATENED TO SHUT OFF SERVICE IN YOUR HOME?: NO
HOW OFTEN DO YOU HAVE SIX OR MORE DRINKS ON ONE OCCASION: NEVER
HOW MANY DRINKS CONTAINING ALCOHOL DO YOU HAVE ON A TYPICAL DAY WHEN YOU ARE DRINKING: PATIENT DOES NOT DRINK
WITHIN THE PAST 12 MONTHS, YOU WORRIED THAT YOUR FOOD WOULD RUN OUT BEFORE YOU GOT THE MONEY TO BUY MORE: NEVER TRUE

## 2024-08-20 ASSESSMENT — LIFESTYLE VARIABLES
HOW MANY STANDARD DRINKS CONTAINING ALCOHOL DO YOU HAVE ON A TYPICAL DAY: PATIENT DOES NOT DRINK
HOW OFTEN DO YOU HAVE SIX OR MORE DRINKS ON ONE OCCASION: NEVER

## 2024-08-21 ENCOUNTER — APPOINTMENT (OUTPATIENT)
Dept: MEDICAL GROUP | Facility: PHYSICIAN GROUP | Age: 69
End: 2024-08-21
Payer: MEDICARE

## 2024-08-21 VITALS
OXYGEN SATURATION: 97 % | TEMPERATURE: 97 F | BODY MASS INDEX: 28.89 KG/M2 | HEART RATE: 74 BPM | HEIGHT: 65 IN | WEIGHT: 173.4 LBS | SYSTOLIC BLOOD PRESSURE: 122 MMHG | DIASTOLIC BLOOD PRESSURE: 62 MMHG | RESPIRATION RATE: 12 BRPM

## 2024-08-21 DIAGNOSIS — M54.2 NECK PAIN: ICD-10-CM

## 2024-08-21 DIAGNOSIS — R73.03 PREDIABETES: ICD-10-CM

## 2024-08-21 DIAGNOSIS — G43.709 CHRONIC MIGRAINE WITHOUT AURA WITHOUT STATUS MIGRAINOSUS, NOT INTRACTABLE: ICD-10-CM

## 2024-08-21 DIAGNOSIS — R53.83 FATIGUE, UNSPECIFIED TYPE: ICD-10-CM

## 2024-08-21 DIAGNOSIS — M17.12 OSTEOARTHRITIS OF LEFT KNEE, UNSPECIFIED OSTEOARTHRITIS TYPE: ICD-10-CM

## 2024-08-21 DIAGNOSIS — E78.00 PURE HYPERCHOLESTEROLEMIA: ICD-10-CM

## 2024-08-21 PROCEDURE — 99214 OFFICE O/P EST MOD 30 MIN: CPT | Performed by: FAMILY MEDICINE

## 2024-08-21 PROCEDURE — 3074F SYST BP LT 130 MM HG: CPT | Performed by: FAMILY MEDICINE

## 2024-08-21 PROCEDURE — 3078F DIAST BP <80 MM HG: CPT | Performed by: FAMILY MEDICINE

## 2024-08-21 RX ORDER — ACETAMINOPHEN 500 MG
2 TABLET ORAL EVERY 8 HOURS
COMMUNITY

## 2024-08-21 RX ORDER — MELOXICAM 15 MG
15 TABLET ORAL DAILY
COMMUNITY
Start: 2024-08-16

## 2024-08-21 ASSESSMENT — ENCOUNTER SYMPTOMS
ABDOMINAL PAIN: 0
VOMITING: 0
COUGH: 0
FEVER: 0
CHILLS: 0
NAUSEA: 0

## 2024-08-21 ASSESSMENT — PATIENT HEALTH QUESTIONNAIRE - PHQ9: CLINICAL INTERPRETATION OF PHQ2 SCORE: 0

## 2024-08-21 ASSESSMENT — FIBROSIS 4 INDEX: FIB4 SCORE: 1.33

## 2024-08-21 NOTE — PROGRESS NOTES
Subjective:     Verbal consent was acquired by the patient to use [employer] ambient listening note generation during this visit      CC:   Chief Complaint   Patient presents with    Establish Care     Nu2u is taking meloxicam 15 mg         History of Present Illness  The patient presents for evaluation of multiple medical concerns.    She experiences headaches that originate from the back of her head and ascend, which she attributes to stress. She is currently caring for her  and mother, the latter being particularly challenging. Her reliance on extra strength Tylenol or Excedrin has increased, with usage exceeding 15 times a month. She reports no aura-like symptoms such as tunnel vision or spots. The onset of pain at the back of her head signals the need for medication. She has not been prescribed any specific migraine medication. She recently started taking meloxicam, which has effectively reduced knee swelling, but its impact on her headaches remains uncertain.    She underwent a L full knee replacement in 09/2023. Post-surgery, fluid accumulation was suspected in her knee, but needle aspiration failed to confirm this due to her discomfort with needles. She has been advised to continue physical therapy and provide x-rays from 04/2024. She had a work-related injury in 2017. She used muscle relaxants in the past but is now cautious about their use. She plans to resume gym workouts.    She questions the possibility of intermittent anemia, given her family history of the condition. Although she does not have anemia, she occasionally experiences extreme fatigue, leading her to take iron supplements for 2 to 3 days, which seems to restore her energy levels. She is unsure if this is a placebo effect. She has a mild snoring problem.    FAMILY HISTORY  She has a family history of spherocytosis. Her 2 sisters have it. One of her sister's daughter and her 2 daughters also have it. She has no family history of  "AFib.           ROS:  Review of Systems   Constitutional:  Negative for chills and fever.   Respiratory:  Negative for cough.    Cardiovascular:  Negative for chest pain.   Gastrointestinal:  Negative for abdominal pain, nausea and vomiting.       Objective:     Exam:  /62 (BP Location: Left arm, Patient Position: Sitting)   Pulse 74   Temp 36.1 °C (97 °F) (Temporal)   Resp 12   Ht 1.651 m (5' 5\")   Wt 78.7 kg (173 lb 6.4 oz)   SpO2 97%   BMI 28.86 kg/m²  Body mass index is 28.86 kg/m².    Physical Exam  Constitutional:       Appearance: Normal appearance.   HENT:      Head: Normocephalic and atraumatic.      Nose: Nose normal.      Mouth/Throat:      Mouth: Mucous membranes are moist.   Eyes:      Extraocular Movements: Extraocular movements intact.      Conjunctiva/sclera: Conjunctivae normal.      Pupils: Pupils are equal, round, and reactive to light.   Neck:      Comments: Tense bilateral trapezius muscles  Cardiovascular:      Rate and Rhythm: Normal rate and regular rhythm.      Heart sounds: No murmur heard.  Pulmonary:      Effort: Pulmonary effort is normal.      Breath sounds: Normal breath sounds.   Abdominal:      General: Abdomen is flat. Bowel sounds are normal.   Musculoskeletal:      Cervical back: Neck supple. No pain with movement, spinous process tenderness or muscular tenderness.   Skin:     General: Skin is warm and dry.   Neurological:      General: No focal deficit present.      Mental Status: She is alert and oriented to person, place, and time.   Psychiatric:         Mood and Affect: Mood normal.         Labs:   Lab Results   Component Value Date/Time    WBC 7.7 07/26/2024 11:46 AM    RBC 4.38 07/26/2024 11:46 AM    HEMOGLOBIN 13.4 07/26/2024 11:46 AM    HEMATOCRIT 40.1 07/26/2024 11:46 AM    MCV 91.6 07/26/2024 11:46 AM    MCH 30.6 07/26/2024 11:46 AM    MCHC 33.4 07/26/2024 11:46 AM    MPV 10.0 07/26/2024 11:46 AM    NEUTSPOLYS 70.50 07/26/2024 11:46 AM    LYMPHOCYTES 20.10 " (L) 07/26/2024 11:46 AM    MONOCYTES 5.30 07/26/2024 11:46 AM    EOSINOPHILS 3.00 07/26/2024 11:46 AM    BASOPHILS 1.00 07/26/2024 11:46 AM         Lab Results   Component Value Date/Time    CHOLSTRLTOT 226 (H) 06/06/2023 12:41 PM     (H) 06/06/2023 12:41 PM    HDL 46 06/06/2023 12:41 PM    TRIGLYCERIDE 166 (H) 06/06/2023 12:41 PM       Lab Results   Component Value Date/Time    SODIUM 142 06/06/2023 12:41 PM    POTASSIUM 4.6 06/06/2023 12:41 PM    CHLORIDE 107 06/06/2023 12:41 PM    CO2 24 06/06/2023 12:41 PM    GLUCOSE 81 06/06/2023 12:41 PM    BUN 23 (H) 06/06/2023 12:41 PM    CREATININE 1.17 06/06/2023 12:41 PM     Lab Results   Component Value Date/Time    ALKPHOSPHAT 73 06/06/2023 12:41 PM    ASTSGOT 20 06/06/2023 12:41 PM    ALTSGPT 15 06/06/2023 12:41 PM    TBILIRUBIN 0.3 06/06/2023 12:41 PM           Assessment & Plan:     69 y.o. female with the following -      1. Chronic migraine without aura without status migrainosus, not intractable  2. Neck pain  - DX-CERVICAL SPINE-2 OR 3 VIEWS; Future    Chronic  At this time believe her headaches/migraines may be due to possible OA or muscle spasms in patient's neck  Will start with xrays  Patient would like to wait on starting PT or a muscle relaxant  Patient to continue meloxicam 15 mg daily  Will also try to avoid triptans given her hx of HTN    3. Prediabetes    Chronic  Patient will need lab work at her next appointment    4. Pure hypercholesterolemia    Chronic  Patient will need lab work at next apt  Patient to continue atorvastatin 20 mg nightly    5. Osteoarthritis of left knee, unspecified osteoarthritis type    Chronic  Managed by Ortho   -CRANDALL  Recent records reviewed  Patient to continue meloxicam 15 mg daily    6. Fatigue, unspecified type    Chronic  Etiology unclear  -family hx of genetic anemia but patient does not appear to have the full version of this  --may be a milder or more recessive version  --her lab work has been normal   Patient  okay to continue PO iron as needed  Does not appear to be NELL or Depression at this time  -CTM          Return in about 3 months (around 11/21/2024).      I personally reviewed and updated and reviewed the patient's personal, social, family and surgical history at today's appointment.     Please note that this dictation was created using voice recognition software. I have made every reasonable attempt to correct obvious errors, but I expect that there are errors of grammar and possibly content that I did not discover before finalizing the note.

## 2024-08-24 ENCOUNTER — HOSPITAL ENCOUNTER (OUTPATIENT)
Dept: RADIOLOGY | Facility: MEDICAL CENTER | Age: 69
End: 2024-08-24
Attending: FAMILY MEDICINE
Payer: MEDICARE

## 2024-08-24 DIAGNOSIS — M54.2 NECK PAIN: ICD-10-CM

## 2024-08-24 DIAGNOSIS — G43.709 CHRONIC MIGRAINE WITHOUT AURA WITHOUT STATUS MIGRAINOSUS, NOT INTRACTABLE: ICD-10-CM

## 2024-08-24 PROCEDURE — 72040 X-RAY EXAM NECK SPINE 2-3 VW: CPT

## 2024-10-11 DIAGNOSIS — J45.20 MILD INTERMITTENT ASTHMA WITHOUT COMPLICATION: ICD-10-CM

## 2024-10-11 DIAGNOSIS — R73.03 PREDIABETES: ICD-10-CM

## 2024-10-11 DIAGNOSIS — R53.83 FATIGUE, UNSPECIFIED TYPE: ICD-10-CM

## 2024-10-11 DIAGNOSIS — R94.4 DECREASED GFR: ICD-10-CM

## 2024-10-11 DIAGNOSIS — E78.00 PURE HYPERCHOLESTEROLEMIA: ICD-10-CM

## 2024-10-25 ENCOUNTER — OFFICE VISIT (OUTPATIENT)
Dept: URGENT CARE | Facility: PHYSICIAN GROUP | Age: 69
End: 2024-10-25
Payer: MEDICARE

## 2024-10-25 VITALS
HEIGHT: 65 IN | OXYGEN SATURATION: 96 % | BODY MASS INDEX: 27.99 KG/M2 | SYSTOLIC BLOOD PRESSURE: 120 MMHG | WEIGHT: 168 LBS | HEART RATE: 112 BPM | TEMPERATURE: 98.9 F | RESPIRATION RATE: 16 BRPM | DIASTOLIC BLOOD PRESSURE: 78 MMHG

## 2024-10-25 DIAGNOSIS — R09.89 SYMPTOMS OF UPPER RESPIRATORY INFECTION (URI): ICD-10-CM

## 2024-10-25 DIAGNOSIS — U07.1 COVID-19: ICD-10-CM

## 2024-10-25 LAB
FLUAV RNA SPEC QL NAA+PROBE: NEGATIVE
FLUBV RNA SPEC QL NAA+PROBE: NEGATIVE
RSV RNA SPEC QL NAA+PROBE: NEGATIVE
SARS-COV-2 RNA RESP QL NAA+PROBE: POSITIVE

## 2024-10-25 PROCEDURE — 3074F SYST BP LT 130 MM HG: CPT | Performed by: NURSE PRACTITIONER

## 2024-10-25 PROCEDURE — 3078F DIAST BP <80 MM HG: CPT | Performed by: NURSE PRACTITIONER

## 2024-10-25 PROCEDURE — 0241U POCT CEPHEID COV-2, FLU A/B, RSV - PCR: CPT | Performed by: NURSE PRACTITIONER

## 2024-10-25 PROCEDURE — 99213 OFFICE O/P EST LOW 20 MIN: CPT | Performed by: NURSE PRACTITIONER

## 2024-10-25 ASSESSMENT — ENCOUNTER SYMPTOMS
DIARRHEA: 0
MYALGIAS: 0
SORE THROAT: 0
CHILLS: 0
COUGH: 0
FEVER: 0
NAUSEA: 0
SHORTNESS OF BREATH: 0
WHEEZING: 0
HEADACHES: 1

## 2024-10-25 ASSESSMENT — FIBROSIS 4 INDEX: FIB4 SCORE: 1.33

## 2024-11-12 ENCOUNTER — HOSPITAL ENCOUNTER (OUTPATIENT)
Dept: LAB | Facility: MEDICAL CENTER | Age: 69
End: 2024-11-12
Attending: FAMILY MEDICINE
Payer: MEDICARE

## 2024-11-12 DIAGNOSIS — E78.00 PURE HYPERCHOLESTEROLEMIA: ICD-10-CM

## 2024-11-12 DIAGNOSIS — J45.20 MILD INTERMITTENT ASTHMA WITHOUT COMPLICATION: ICD-10-CM

## 2024-11-12 DIAGNOSIS — R94.4 DECREASED GFR: ICD-10-CM

## 2024-11-12 DIAGNOSIS — R53.83 FATIGUE, UNSPECIFIED TYPE: ICD-10-CM

## 2024-11-12 DIAGNOSIS — R73.03 PREDIABETES: ICD-10-CM

## 2024-11-12 LAB
BASOPHILS # BLD AUTO: 1.3 % (ref 0–1.8)
BASOPHILS # BLD: 0.1 K/UL (ref 0–0.12)
EOSINOPHIL # BLD AUTO: 0.24 K/UL (ref 0–0.51)
EOSINOPHIL NFR BLD: 3.1 % (ref 0–6.9)
ERYTHROCYTE [DISTWIDTH] IN BLOOD BY AUTOMATED COUNT: 40.5 FL (ref 35.9–50)
EST. AVERAGE GLUCOSE BLD GHB EST-MCNC: 137 MG/DL
HBA1C MFR BLD: 6.4 % (ref 4–5.6)
HCT VFR BLD AUTO: 41.4 % (ref 37–47)
HGB BLD-MCNC: 13.3 G/DL (ref 12–16)
IMM GRANULOCYTES # BLD AUTO: 0.06 K/UL (ref 0–0.11)
IMM GRANULOCYTES NFR BLD AUTO: 0.8 % (ref 0–0.9)
LYMPHOCYTES # BLD AUTO: 1.55 K/UL (ref 1–4.8)
LYMPHOCYTES NFR BLD: 20.2 % (ref 22–41)
MCH RBC QN AUTO: 29.6 PG (ref 27–33)
MCHC RBC AUTO-ENTMCNC: 32.1 G/DL (ref 32.2–35.5)
MCV RBC AUTO: 92 FL (ref 81.4–97.8)
MONOCYTES # BLD AUTO: 0.49 K/UL (ref 0–0.85)
MONOCYTES NFR BLD AUTO: 6.4 % (ref 0–13.4)
NEUTROPHILS # BLD AUTO: 5.22 K/UL (ref 1.82–7.42)
NEUTROPHILS NFR BLD: 68.2 % (ref 44–72)
NRBC # BLD AUTO: 0 K/UL
NRBC BLD-RTO: 0 /100 WBC (ref 0–0.2)
PLATELET # BLD AUTO: 328 K/UL (ref 164–446)
PMV BLD AUTO: 10.2 FL (ref 9–12.9)
RBC # BLD AUTO: 4.5 M/UL (ref 4.2–5.4)
WBC # BLD AUTO: 7.7 K/UL (ref 4.8–10.8)

## 2024-11-12 PROCEDURE — 85025 COMPLETE CBC W/AUTO DIFF WBC: CPT

## 2024-11-12 PROCEDURE — 82306 VITAMIN D 25 HYDROXY: CPT | Mod: GA

## 2024-11-12 PROCEDURE — 84443 ASSAY THYROID STIM HORMONE: CPT

## 2024-11-12 PROCEDURE — 36415 COLL VENOUS BLD VENIPUNCTURE: CPT | Mod: GA

## 2024-11-12 PROCEDURE — 80053 COMPREHEN METABOLIC PANEL: CPT

## 2024-11-12 PROCEDURE — 84439 ASSAY OF FREE THYROXINE: CPT

## 2024-11-12 PROCEDURE — 80061 LIPID PANEL: CPT

## 2024-11-12 PROCEDURE — 83036 HEMOGLOBIN GLYCOSYLATED A1C: CPT | Mod: GA

## 2024-11-13 LAB
25(OH)D3 SERPL-MCNC: 25 NG/ML (ref 30–100)
ALBUMIN SERPL BCP-MCNC: 4.4 G/DL (ref 3.2–4.9)
ALBUMIN/GLOB SERPL: 1.2 G/DL
ALP SERPL-CCNC: 136 U/L (ref 30–99)
ALT SERPL-CCNC: 41 U/L (ref 2–50)
ANION GAP SERPL CALC-SCNC: 10 MMOL/L (ref 7–16)
AST SERPL-CCNC: 39 U/L (ref 12–45)
BILIRUB SERPL-MCNC: 0.4 MG/DL (ref 0.1–1.5)
BUN SERPL-MCNC: 24 MG/DL (ref 8–22)
CALCIUM ALBUM COR SERPL-MCNC: 9.7 MG/DL (ref 8.5–10.5)
CALCIUM SERPL-MCNC: 10 MG/DL (ref 8.5–10.5)
CHLORIDE SERPL-SCNC: 105 MMOL/L (ref 96–112)
CHOLEST SERPL-MCNC: 176 MG/DL (ref 100–199)
CO2 SERPL-SCNC: 24 MMOL/L (ref 20–33)
CREAT SERPL-MCNC: 1.3 MG/DL (ref 0.5–1.4)
GFR SERPLBLD CREATININE-BSD FMLA CKD-EPI: 45 ML/MIN/1.73 M 2
GLOBULIN SER CALC-MCNC: 3.6 G/DL (ref 1.9–3.5)
GLUCOSE SERPL-MCNC: 114 MG/DL (ref 65–99)
HDLC SERPL-MCNC: 55 MG/DL
LDLC SERPL CALC-MCNC: 96 MG/DL
POTASSIUM SERPL-SCNC: 4.8 MMOL/L (ref 3.6–5.5)
PROT SERPL-MCNC: 8 G/DL (ref 6–8.2)
SODIUM SERPL-SCNC: 139 MMOL/L (ref 135–145)
T4 FREE SERPL-MCNC: 1.18 NG/DL (ref 0.93–1.7)
TRIGL SERPL-MCNC: 124 MG/DL (ref 0–149)
TSH SERPL-ACNC: 1.07 UIU/ML (ref 0.35–5.5)

## 2024-11-21 ENCOUNTER — APPOINTMENT (OUTPATIENT)
Dept: RADIOLOGY | Facility: MEDICAL CENTER | Age: 69
End: 2024-11-21
Attending: FAMILY MEDICINE
Payer: MEDICARE

## 2024-11-21 ENCOUNTER — OFFICE VISIT (OUTPATIENT)
Dept: MEDICAL GROUP | Facility: PHYSICIAN GROUP | Age: 69
End: 2024-11-21
Payer: MEDICARE

## 2024-11-21 VITALS
HEIGHT: 66 IN | WEIGHT: 169.4 LBS | TEMPERATURE: 97.4 F | BODY MASS INDEX: 27.23 KG/M2 | OXYGEN SATURATION: 98 % | SYSTOLIC BLOOD PRESSURE: 134 MMHG | HEART RATE: 88 BPM | DIASTOLIC BLOOD PRESSURE: 64 MMHG

## 2024-11-21 DIAGNOSIS — J40 BRONCHITIS: ICD-10-CM

## 2024-11-21 DIAGNOSIS — N18.31 STAGE 3A CHRONIC KIDNEY DISEASE: ICD-10-CM

## 2024-11-21 DIAGNOSIS — J45.20 MILD INTERMITTENT ASTHMA WITHOUT COMPLICATION: ICD-10-CM

## 2024-11-21 DIAGNOSIS — R74.8 ELEVATED ALKALINE PHOSPHATASE LEVEL: ICD-10-CM

## 2024-11-21 DIAGNOSIS — U07.1 COVID: ICD-10-CM

## 2024-11-21 DIAGNOSIS — E78.00 PURE HYPERCHOLESTEROLEMIA: ICD-10-CM

## 2024-11-21 DIAGNOSIS — R73.03 PREDIABETES: ICD-10-CM

## 2024-11-21 PROCEDURE — 3075F SYST BP GE 130 - 139MM HG: CPT | Performed by: FAMILY MEDICINE

## 2024-11-21 PROCEDURE — 3078F DIAST BP <80 MM HG: CPT | Performed by: FAMILY MEDICINE

## 2024-11-21 PROCEDURE — 99214 OFFICE O/P EST MOD 30 MIN: CPT | Performed by: FAMILY MEDICINE

## 2024-11-21 PROCEDURE — 71046 X-RAY EXAM CHEST 2 VIEWS: CPT

## 2024-11-21 RX ORDER — AZITHROMYCIN 250 MG/1
TABLET, FILM COATED ORAL
Qty: 6 TABLET | Refills: 0 | Status: SHIPPED | OUTPATIENT
Start: 2024-11-21

## 2024-11-21 RX ORDER — METHYLPREDNISOLONE 4 MG/1
TABLET ORAL
Qty: 21 TABLET | Refills: 0 | Status: SHIPPED | OUTPATIENT
Start: 2024-11-21

## 2024-11-21 RX ORDER — ALBUTEROL SULFATE 90 UG/1
2 INHALANT RESPIRATORY (INHALATION) 2 TIMES DAILY
Qty: 8.5 G | Refills: 11 | Status: SHIPPED | OUTPATIENT
Start: 2024-11-21

## 2024-11-21 RX ORDER — BENZONATATE 100 MG/1
100 CAPSULE ORAL 3 TIMES DAILY PRN
Qty: 60 CAPSULE | Refills: 0 | Status: SHIPPED | OUTPATIENT
Start: 2024-11-21

## 2024-11-21 ASSESSMENT — FIBROSIS 4 INDEX: FIB4 SCORE: 1.26

## 2024-11-21 NOTE — PROGRESS NOTES
"Verbal consent was acquired by the patient to use [employer] ambient listening note generation during this visit     Subjective:     HPI:   History of Present Illness  Patient presents to clinic today to follow-up on her lab work and to discuss her still feeling ill from her recent COVID infection at the end of October.  Patient states that she, her  and her mother all had COVID around the same time and they are better but she is not.  She states she still has coughing fits, occasional sore throat and chest tightness/soreness.  She states her appetite has not been as good and she just feels fatigued. She has not been using her albuterol as it has all .         Objective:     Exam:  /64 (BP Location: Left arm, Patient Position: Sitting, BP Cuff Size: Adult)   Pulse 88   Temp 36.3 °C (97.4 °F) (Temporal)   Ht 1.664 m (5' 5.5\")   Wt 76.8 kg (169 lb 6.4 oz)   SpO2 98%   BMI 27.76 kg/m²  Body mass index is 27.76 kg/m².    Physical Exam  Constitutional:       Appearance: She is ill-appearing.   HENT:      Head: Normocephalic and atraumatic.      Comments: Hoarse voice     Nose: Nose normal.      Mouth/Throat:      Mouth: Mucous membranes are moist.   Eyes:      Extraocular Movements: Extraocular movements intact.      Conjunctiva/sclera: Conjunctivae normal.      Pupils: Pupils are equal, round, and reactive to light.   Cardiovascular:      Rate and Rhythm: Normal rate and regular rhythm.      Heart sounds: No murmur heard.  Pulmonary:      Effort: Pulmonary effort is normal.      Breath sounds: Examination of the right-upper field reveals decreased breath sounds. Examination of the right-middle field reveals decreased breath sounds. Examination of the right-lower field reveals decreased breath sounds. Decreased breath sounds present. No wheezing, rhonchi or rales.   Abdominal:      General: Abdomen is flat. Bowel sounds are normal.      Palpations: Abdomen is soft.   Musculoskeletal:      " Cervical back: Neck supple.   Lymphadenopathy:      Cervical: No cervical adenopathy.   Skin:     General: Skin is warm and dry.   Neurological:      General: No focal deficit present.      Mental Status: She is alert and oriented to person, place, and time.   Psychiatric:         Mood and Affect: Mood normal.           Results  Lab Results   Component Value Date/Time    WBC 7.7 11/12/2024 10:44 AM    RBC 4.50 11/12/2024 10:44 AM    HEMOGLOBIN 13.3 11/12/2024 10:44 AM    HEMATOCRIT 41.4 11/12/2024 10:44 AM    MCV 92.0 11/12/2024 10:44 AM    MCH 29.6 11/12/2024 10:44 AM    MCHC 32.1 (L) 11/12/2024 10:44 AM    MPV 10.2 11/12/2024 10:44 AM    NEUTSPOLYS 68.20 11/12/2024 10:44 AM    LYMPHOCYTES 20.20 (L) 11/12/2024 10:44 AM    MONOCYTES 6.40 11/12/2024 10:44 AM    EOSINOPHILS 3.10 11/12/2024 10:44 AM    BASOPHILS 1.30 11/12/2024 10:44 AM       Lab Results   Component Value Date/Time    CHOLSTRLTOT 176 11/12/2024 10:44 AM    LDL 96 11/12/2024 10:44 AM    HDL 55 11/12/2024 10:44 AM    TRIGLYCERIDE 124 11/12/2024 10:44 AM       Lab Results   Component Value Date/Time    SODIUM 139 11/12/2024 10:44 AM    POTASSIUM 4.8 11/12/2024 10:44 AM    CHLORIDE 105 11/12/2024 10:44 AM    CO2 24 11/12/2024 10:44 AM    GLUCOSE 114 (H) 11/12/2024 10:44 AM    BUN 24 (H) 11/12/2024 10:44 AM    CREATININE 1.30 11/12/2024 10:44 AM     Lab Results   Component Value Date/Time    ALKPHOSPHAT 136 (H) 11/12/2024 10:44 AM    ASTSGOT 39 11/12/2024 10:44 AM    ALTSGPT 41 11/12/2024 10:44 AM    TBILIRUBIN 0.4 11/12/2024 10:44 AM         Assessment & Plan:     1. Bronchitis  DX-CHEST-2 VIEWS    benzonatate (TESSALON) 100 MG Cap    methylPREDNISolone (MEDROL DOSEPAK) 4 MG Tablet Therapy Pack    azithromycin (ZITHROMAX) 250 MG Tab      2. COVID  DX-CHEST-2 VIEWS    benzonatate (TESSALON) 100 MG Cap      3. Mild intermittent asthma without complication  albuterol 108 (90 Base) MCG/ACT Aero Soln inhalation aerosol    methylPREDNISolone (MEDROL DOSEPAK) 4  MG Tablet Therapy Pack      4. Prediabetes        5. Pure hypercholesterolemia        6. Stage 3a chronic kidney disease        7. Elevated alkaline phosphatase level            Assessment & Plan  Asthma/COVID/Bronchitis  Acute.  Patient's symptoms likely prolonged due to her history of mild asthma.  Will order a chest x-ray and send in a prescription for a Medrol Dosepak, Tessalon Perles and a Z-Ken.  Will also refill her albuterol inhaler.    2.  Prediabetes  Chronic.  Discussed lifestyle modifications including diet and exercise.    3.  Hypercholesterolemia  Chronic.  Well controlled. UTD on lab work.  Is to continue atorvastatin 20 mg nightly.    4.  CKD  Chronic.  Stable.  Patient is to try to avoid nephrotoxic medications.    5.  Elevated alkaline phosphatase  Acute. Likely due to her recent COVID infection. Will repeat in 3 months.        Return in about 3 months (around 2/21/2025).    Please note that this dictation was created using voice recognition software. I have made every reasonable attempt to correct obvious errors, but I expect that there are errors of grammar and possibly content that I did not discover before finalizing the note.        I personally reviewed and updated and reviewed the patient's personal, social, family and surgical history at today's appointment.

## 2025-02-26 ENCOUNTER — OFFICE VISIT (OUTPATIENT)
Dept: MEDICAL GROUP | Facility: PHYSICIAN GROUP | Age: 70
End: 2025-02-26
Payer: MEDICARE

## 2025-02-26 VITALS
HEART RATE: 76 BPM | HEIGHT: 65 IN | SYSTOLIC BLOOD PRESSURE: 117 MMHG | OXYGEN SATURATION: 97 % | DIASTOLIC BLOOD PRESSURE: 61 MMHG | BODY MASS INDEX: 28.49 KG/M2 | TEMPERATURE: 97.8 F | WEIGHT: 171 LBS

## 2025-02-26 DIAGNOSIS — D22.9 MULTIPLE NEVI: ICD-10-CM

## 2025-02-26 DIAGNOSIS — I10 PRIMARY HYPERTENSION: ICD-10-CM

## 2025-02-26 DIAGNOSIS — R73.03 PREDIABETES: ICD-10-CM

## 2025-02-26 DIAGNOSIS — E78.00 PURE HYPERCHOLESTEROLEMIA: ICD-10-CM

## 2025-02-26 DIAGNOSIS — Z23 NEED FOR VACCINATION: ICD-10-CM

## 2025-02-26 DIAGNOSIS — D48.5 NEOPLASM OF UNCERTAIN BEHAVIOR OF SKIN: ICD-10-CM

## 2025-02-26 DIAGNOSIS — M17.12 OSTEOARTHRITIS OF LEFT KNEE, UNSPECIFIED OSTEOARTHRITIS TYPE: ICD-10-CM

## 2025-02-26 RX ORDER — LISINOPRIL 10 MG/1
10 TABLET ORAL DAILY
Qty: 90 TABLET | Refills: 3 | Status: SHIPPED | OUTPATIENT
Start: 2025-02-26

## 2025-02-26 ASSESSMENT — PATIENT HEALTH QUESTIONNAIRE - PHQ9: CLINICAL INTERPRETATION OF PHQ2 SCORE: 0

## 2025-02-26 ASSESSMENT — FIBROSIS 4 INDEX: FIB4 SCORE: 1.28

## 2025-02-27 NOTE — PROGRESS NOTES
Verbal consent was acquired by the patient to use [employer] ambient listening note generation during this visit     Subjective:     HPI:   History of Present Illness  The patient presents for evaluation of a mole on her right leg, hypertension, knee pain, and health maintenance.    Mole on Right Leg  She reports the presence of a mole on her right leg, which is located in an area that she is unable to visually inspect. She recently noticed erythema surrounding the mole, despite no prior issues with it. She is uncertain if the redness is a result of inadvertent scratching.  - Onset: Recently noticed erythema.  - Location: Right leg.  - Character: Erythema surrounding the mole.  - Alleviating/Aggravating Factors: Uncertain if redness is due to inadvertent scratching.    Hypertension  Her lisinopril prescription has , and she is uncertain about the continuation of auto-refill. Despite the stress related to her mother's health, her blood pressure remains within normal limits.  - Alleviating/Aggravating Factors: Stress related to her mother's health.  - Severity: Blood pressure remains within normal limits.    L Knee Pain  She has a history of recurrent injuries to her knee, with 4 to 5 incidents reported. She recalls an initial injury at the age of 18 during a skiing accident, where her right knee was treated but the left knee was not evaluated. She is currently under the care of an orthopedic specialist, whom she consults monthly due to a worker's compensation claim. A brace has been ordered for her, as the cause of her instability remains undetermined. She reports a sensation of something snapping in her knee, which was not present during her last visit. She also notes intermittent swelling in the knee. The possibility of surgical intervention has been discussed, but she prefers to trial the brace first. Her worker's compensation claim has been denied due to exceeding the approved number of physical therapy  "sessions.  - Onset: Initial injury at age 18 during a skiing accident.  - Location: Knee (initially right knee, current issues unspecified).  - Character: Sensation of something snapping, intermittent swelling.  - Alleviating/Aggravating Factors: Brace ordered, prefers to trial the brace first.  - Timing: Monthly consultations with orthopedic specialist.  - Severity: Worker's compensation claim denied due to exceeding approved physical therapy sessions.    Health Maintenance  She has not yet undergone her annual eye examination, which is scheduled for 04/2025. She did not receive the influenza vaccine last fall due to a concurrent COVID-19 infection and subsequent bronchitis. She is inquiring about the necessity of receiving the influenza vaccine at this time.    FAMILY HISTORY  Her sister has been diagnosed with atrial fibrillation. Her mother had congestive heart failure. Her father was healthy until he developed dementia.    MEDICATIONS  Current: Lisinopril    IMMUNIZATIONS  She is up to date with her pneumonia vaccine. She did not receive the influenza vaccine last fall due to a concurrent COVID-19 infection and subsequent bronchitis.        Objective:     Exam:  /61 (BP Location: Left arm, Patient Position: Sitting, BP Cuff Size: Adult)   Pulse 76   Temp 36.6 °C (97.8 °F) (Temporal)   Ht 1.651 m (5' 5\")   Wt 77.6 kg (171 lb)   SpO2 97%   BMI 28.46 kg/m²  Body mass index is 28.46 kg/m².    Physical Exam  Constitutional:       Appearance: Normal appearance.   HENT:      Head: Normocephalic and atraumatic.      Nose: Nose normal.      Mouth/Throat:      Mouth: Mucous membranes are moist.   Eyes:      Extraocular Movements: Extraocular movements intact.      Conjunctiva/sclera: Conjunctivae normal.      Pupils: Pupils are equal, round, and reactive to light.   Cardiovascular:      Rate and Rhythm: Normal rate and regular rhythm.   Pulmonary:      Effort: Pulmonary effort is normal.      Breath sounds: " Normal breath sounds.   Abdominal:      General: Abdomen is flat. Bowel sounds are normal.      Palpations: Abdomen is soft.   Musculoskeletal:      Cervical back: Neck supple.      Left knee: Swelling present.   Skin:     General: Skin is warm and dry.      Comments: Benign appearing mole behind R knee   Neurological:      General: No focal deficit present.      Mental Status: She is alert and oriented to person, place, and time.   Psychiatric:         Mood and Affect: Mood normal.             Results      Assessment & Plan:     1. Neoplasm of uncertain behavior of skin        2. Primary hypertension  lisinopril (PRINIVIL) 10 MG Tab      3. Multiple nevi        4. Adjustment disorder with depressed mood        5. Osteoarthritis of left knee, unspecified osteoarthritis type        6. Prediabetes        7. Pure hypercholesterolemia        8. Need for vaccination  INFLUENZA VACCINE, HIGH DOSE (65+ ONLY)          Assessment & Plan  1. Mole on the right leg/Neoplasm of skin  - Mole appears healthy with no signs of inflammation or redness  - Uniform in color and circular shape  - Possibility of inadvertent scratching  - Seborrheic keratosis observed adjacent to the mole (benign condition)    2. Hypertension  - Blood pressure readings within normal range  - Renew and send prescription for lisinopril to pharmacy  - UTD on lab work    3. L Knee pain  - Reports instability and swelling in knee with multiple injuries  - Continue using brace as recommended by orthopedic specialist    - Records reviewed  - Consider further surgical intervention if symptoms persist    4. Health maintenance  - Advised to receive influenza vaccine due to high prevalence of flu  - Appointment scheduled for eye exam in April    5. DLD/IFG  - Chronic  - UTD on lab work      Follow-up  - Patient to follow up in 6 months        Return in about 6 months (around 8/26/2025) for Medicare AW.    Please note that this dictation was created using voice  recognition software. I have made every reasonable attempt to correct obvious errors, but I expect that there are errors of grammar and possibly content that I did not discover before finalizing the note.      I personally reviewed and updated and reviewed the patient's personal, social, family and surgical history at today's appointment.

## 2025-06-18 ENCOUNTER — PATIENT MESSAGE (OUTPATIENT)
Dept: MEDICAL GROUP | Facility: PHYSICIAN GROUP | Age: 70
End: 2025-06-18
Payer: MEDICARE

## 2025-06-18 DIAGNOSIS — E78.00 PURE HYPERCHOLESTEROLEMIA: ICD-10-CM

## 2025-06-18 RX ORDER — ATORVASTATIN CALCIUM 20 MG/1
20 TABLET, FILM COATED ORAL NIGHTLY
Qty: 90 TABLET | Refills: 3 | Status: SHIPPED | OUTPATIENT
Start: 2025-06-18

## 2025-08-25 ENCOUNTER — OFFICE VISIT (OUTPATIENT)
Dept: MEDICAL GROUP | Facility: PHYSICIAN GROUP | Age: 70
End: 2025-08-25
Payer: MEDICARE

## 2025-08-25 VITALS
HEIGHT: 65 IN | SYSTOLIC BLOOD PRESSURE: 132 MMHG | WEIGHT: 175 LBS | OXYGEN SATURATION: 97 % | TEMPERATURE: 98.2 F | DIASTOLIC BLOOD PRESSURE: 82 MMHG | HEART RATE: 75 BPM | BODY MASS INDEX: 29.16 KG/M2

## 2025-08-25 DIAGNOSIS — Z00.00 ENCOUNTER FOR MEDICARE ANNUAL WELLNESS EXAM: Primary | ICD-10-CM

## 2025-08-25 DIAGNOSIS — R73.03 PREDIABETES: ICD-10-CM

## 2025-08-25 DIAGNOSIS — R74.8 ELEVATED ALKALINE PHOSPHATASE LEVEL: ICD-10-CM

## 2025-08-25 DIAGNOSIS — N18.31 STAGE 3A CHRONIC KIDNEY DISEASE: ICD-10-CM

## 2025-08-25 DIAGNOSIS — E78.00 PURE HYPERCHOLESTEROLEMIA: ICD-10-CM

## 2025-08-25 DIAGNOSIS — I10 PRIMARY HYPERTENSION: ICD-10-CM

## 2025-08-25 DIAGNOSIS — F45.41 STRESS HEADACHES: ICD-10-CM

## 2025-08-25 DIAGNOSIS — J30.9 ALLERGIC RHINITIS, UNSPECIFIED SEASONALITY, UNSPECIFIED TRIGGER: ICD-10-CM

## 2025-08-25 DIAGNOSIS — M17.12 OSTEOARTHRITIS OF LEFT KNEE, UNSPECIFIED OSTEOARTHRITIS TYPE: ICD-10-CM

## 2025-08-25 DIAGNOSIS — Z12.31 ENCOUNTER FOR SCREENING MAMMOGRAM FOR MALIGNANT NEOPLASM OF BREAST: ICD-10-CM

## 2025-08-25 PROCEDURE — G0438 PPPS, INITIAL VISIT: HCPCS | Performed by: FAMILY MEDICINE

## 2025-08-25 PROCEDURE — 99213 OFFICE O/P EST LOW 20 MIN: CPT | Mod: 25 | Performed by: FAMILY MEDICINE

## 2025-08-25 PROCEDURE — 3079F DIAST BP 80-89 MM HG: CPT | Performed by: FAMILY MEDICINE

## 2025-08-25 PROCEDURE — 3075F SYST BP GE 130 - 139MM HG: CPT | Performed by: FAMILY MEDICINE

## 2025-08-25 RX ORDER — AMOXICILLIN 500 MG/1
CAPSULE ORAL
COMMUNITY
End: 2025-08-25

## 2025-08-25 ASSESSMENT — ENCOUNTER SYMPTOMS: GENERAL WELL-BEING: GOOD

## 2025-08-25 ASSESSMENT — PATIENT HEALTH QUESTIONNAIRE - PHQ9: CLINICAL INTERPRETATION OF PHQ2 SCORE: 0

## 2025-08-25 ASSESSMENT — ACTIVITIES OF DAILY LIVING (ADL): BATHING_REQUIRES_ASSISTANCE: 0

## 2025-08-25 ASSESSMENT — FIBROSIS 4 INDEX: FIB4 SCORE: 1.28
